# Patient Record
Sex: MALE | Race: WHITE | Employment: FULL TIME | URBAN - METROPOLITAN AREA
[De-identification: names, ages, dates, MRNs, and addresses within clinical notes are randomized per-mention and may not be internally consistent; named-entity substitution may affect disease eponyms.]

---

## 2018-03-09 ENCOUNTER — OFFICE VISIT (OUTPATIENT)
Dept: INTERNAL MEDICINE CLINIC | Facility: CLINIC | Age: 43
End: 2018-03-09

## 2018-03-09 VITALS
DIASTOLIC BLOOD PRESSURE: 90 MMHG | SYSTOLIC BLOOD PRESSURE: 150 MMHG | BODY MASS INDEX: 36.45 KG/M2 | WEIGHT: 275 LBS | HEIGHT: 73 IN | TEMPERATURE: 98 F | HEART RATE: 88 BPM | RESPIRATION RATE: 18 BRPM

## 2018-03-09 DIAGNOSIS — Z13.21 SCREENING FOR ENDOCRINE, NUTRITIONAL, METABOLIC AND IMMUNITY DISORDER: ICD-10-CM

## 2018-03-09 DIAGNOSIS — Z13.29 SCREENING FOR THYROID DISORDER: ICD-10-CM

## 2018-03-09 DIAGNOSIS — Z13.220 SCREENING FOR LIPID DISORDERS: ICD-10-CM

## 2018-03-09 DIAGNOSIS — Z30.09 VASECTOMY EVALUATION: ICD-10-CM

## 2018-03-09 DIAGNOSIS — Z13.0 SCREENING FOR ENDOCRINE, NUTRITIONAL, METABOLIC AND IMMUNITY DISORDER: ICD-10-CM

## 2018-03-09 DIAGNOSIS — Z13.0 ENCOUNTER FOR SCREENING FOR DISEASES OF THE BLOOD AND BLOOD-FORMING ORGANS AND CERTAIN DISORDERS INVOLVING THE IMMUNE MECHANISM: ICD-10-CM

## 2018-03-09 DIAGNOSIS — I10 ESSENTIAL HYPERTENSION: Primary | ICD-10-CM

## 2018-03-09 DIAGNOSIS — Z13.29 SCREENING FOR ENDOCRINE, NUTRITIONAL, METABOLIC AND IMMUNITY DISORDER: ICD-10-CM

## 2018-03-09 DIAGNOSIS — Z13.228 SCREENING FOR ENDOCRINE, NUTRITIONAL, METABOLIC AND IMMUNITY DISORDER: ICD-10-CM

## 2018-03-09 PROCEDURE — 99203 OFFICE O/P NEW LOW 30 MIN: CPT | Performed by: INTERNAL MEDICINE

## 2018-03-09 RX ORDER — METOPROLOL SUCCINATE 50 MG/1
50 TABLET, EXTENDED RELEASE ORAL DAILY
Qty: 30 TABLET | Refills: 2 | Status: SHIPPED | OUTPATIENT
Start: 2018-03-09 | End: 2018-03-22 | Stop reason: DRUGHIGH

## 2018-03-09 NOTE — PROGRESS NOTES
Rachel Blue is a 40year old male. Patient presents with:  Referral: AB RM 3  Stress: pt c/o being under stress family issues and work BP has been high 200/102      HPI:     Patient here to establish care-   with 6 kids.  They do not want any respiratory distress, lungs clear to auscultation  CARDIO: RRR nl S1 S2  GI: normal bowel sounds, no masses, HSM or tenderness  EXTREMITIES: no cyanosis, clubbing or edema, normal strength and tone  NEURO: Alert and oriented    ASSESSMENT AND PLAN:   Dilcia

## 2018-03-21 ENCOUNTER — TELEPHONE (OUTPATIENT)
Dept: INTERNAL MEDICINE CLINIC | Facility: CLINIC | Age: 43
End: 2018-03-21

## 2018-03-21 NOTE — TELEPHONE ENCOUNTER
Pt was seen a couple weeks ago his blood pressure is still pretty high. It ranges between 150-160/100.  He is scheduled for a month follow up on Future Appointments  Date Time Provider Guille Hughes   4/10/2018 9:00 AM Mellisa Tamayo MD [facility]

## 2018-03-21 NOTE — TELEPHONE ENCOUNTER
Pt stating BP consistent 150-160's/100. No sx at this time. No dizziness. No chest pain. No SOB. No HA. No palpations. Takes Metoprolol Succinate ER 50 MG daily. Increase dose? Sooner OV then 4/10? Please advise. Thank you.

## 2018-03-21 NOTE — TELEPHONE ENCOUNTER
Called pt to inform, per TB, sooner OV needed for BP- scheduled appt on 3/22 as requested. Pt verbalized understanding and agreed with POC.

## 2018-03-22 ENCOUNTER — OFFICE VISIT (OUTPATIENT)
Dept: INTERNAL MEDICINE CLINIC | Facility: CLINIC | Age: 43
End: 2018-03-22

## 2018-03-22 VITALS
TEMPERATURE: 98 F | HEIGHT: 73 IN | BODY MASS INDEX: 36.71 KG/M2 | HEART RATE: 88 BPM | SYSTOLIC BLOOD PRESSURE: 140 MMHG | DIASTOLIC BLOOD PRESSURE: 90 MMHG | WEIGHT: 277 LBS | RESPIRATION RATE: 16 BRPM

## 2018-03-22 DIAGNOSIS — I10 ESSENTIAL HYPERTENSION: Primary | ICD-10-CM

## 2018-03-22 DIAGNOSIS — E66.9 OBESITY (BMI 30-39.9): ICD-10-CM

## 2018-03-22 PROCEDURE — 99213 OFFICE O/P EST LOW 20 MIN: CPT | Performed by: INTERNAL MEDICINE

## 2018-03-22 RX ORDER — METOPROLOL SUCCINATE 100 MG/1
100 TABLET, EXTENDED RELEASE ORAL DAILY
Qty: 30 TABLET | Refills: 3 | Status: SHIPPED | OUTPATIENT
Start: 2018-03-22 | End: 2018-07-16

## 2018-03-22 NOTE — PROGRESS NOTES
Atul Jj is a 40year old male. Patient presents with:   Follow - Up: AB  RM 4, patient been taking bp at home 150/100, 160/102      HPI:     Patient here for f/u -  htn- BP still running high, 150-160/100s, no symptoms of CP/HA/SOB, tolerating me exercise to lose weight    No orders of the defined types were placed in this encounter.       Meds & Refills for this Visit:  Signed Prescriptions Disp Refills    Metoprolol Succinate  MG Oral Tablet 24 Hr 30 tablet 3      Sig: Take 1 tablet (100 mg

## 2018-04-04 ENCOUNTER — PATIENT MESSAGE (OUTPATIENT)
Dept: INTERNAL MEDICINE CLINIC | Facility: CLINIC | Age: 43
End: 2018-04-04

## 2018-04-04 RX ORDER — LISINOPRIL 10 MG/1
10 TABLET ORAL DAILY
Qty: 30 TABLET | Refills: 3 | Status: SHIPPED | OUTPATIENT
Start: 2018-04-04 | End: 2018-07-16

## 2018-04-04 NOTE — TELEPHONE ENCOUNTER
From: Cy Finnegan III  To: Meryle Barrs, MD  Sent: 4/4/2018 11:03 AM CDT  Subject: Karine Ho - BP Readings    3/29/18 - 146/96  3/30/18 - 148/98  3/31/18 - 140/90  4/01/18 - 148/99  4/02/18 - 150/100  4/03/18 - 142/90  4/04/18 - 148/98

## 2018-04-04 NOTE — TELEPHONE ENCOUNTER
ASSESSMENT AND PLAN:   Essential hypertension  -BP improved but not at goal. Will increase metoprolol succ to 100mg daily. Parameters reviewed. Can mychart me home BPs.

## 2018-05-22 RX ORDER — METOPROLOL SUCCINATE 50 MG/1
TABLET, EXTENDED RELEASE ORAL
Qty: 30 TABLET | Refills: 0 | OUTPATIENT
Start: 2018-05-22

## 2018-07-03 ENCOUNTER — LAB ENCOUNTER (OUTPATIENT)
Dept: LAB | Age: 43
End: 2018-07-03
Attending: INTERNAL MEDICINE
Payer: MEDICAID

## 2018-07-03 DIAGNOSIS — Z13.220 SCREENING FOR LIPID DISORDERS: ICD-10-CM

## 2018-07-03 DIAGNOSIS — Z13.29 SCREENING FOR THYROID DISORDER: ICD-10-CM

## 2018-07-03 DIAGNOSIS — Z13.0 SCREENING FOR ENDOCRINE, NUTRITIONAL, METABOLIC AND IMMUNITY DISORDER: ICD-10-CM

## 2018-07-03 DIAGNOSIS — Z13.29 SCREENING FOR ENDOCRINE, NUTRITIONAL, METABOLIC AND IMMUNITY DISORDER: ICD-10-CM

## 2018-07-03 DIAGNOSIS — Z13.21 SCREENING FOR ENDOCRINE, NUTRITIONAL, METABOLIC AND IMMUNITY DISORDER: ICD-10-CM

## 2018-07-03 DIAGNOSIS — Z13.228 SCREENING FOR ENDOCRINE, NUTRITIONAL, METABOLIC AND IMMUNITY DISORDER: ICD-10-CM

## 2018-07-03 DIAGNOSIS — R73.01 IFG (IMPAIRED FASTING GLUCOSE): ICD-10-CM

## 2018-07-03 DIAGNOSIS — Z13.0 ENCOUNTER FOR SCREENING FOR DISEASES OF THE BLOOD AND BLOOD-FORMING ORGANS AND CERTAIN DISORDERS INVOLVING THE IMMUNE MECHANISM: ICD-10-CM

## 2018-07-03 LAB
ALBUMIN SERPL-MCNC: 3.4 G/DL (ref 3.5–4.8)
ALP LIVER SERPL-CCNC: 78 U/L (ref 45–117)
ALT SERPL-CCNC: 22 U/L (ref 17–63)
AST SERPL-CCNC: 18 U/L (ref 15–41)
BASOPHILS # BLD AUTO: 0.03 X10(3) UL (ref 0–0.1)
BASOPHILS NFR BLD AUTO: 0.7 %
BILIRUB SERPL-MCNC: 0.9 MG/DL (ref 0.1–2)
BUN BLD-MCNC: 11 MG/DL (ref 8–20)
CALCIUM BLD-MCNC: 8.9 MG/DL (ref 8.3–10.3)
CHLORIDE: 100 MMOL/L (ref 101–111)
CHOLEST SMN-MCNC: 179 MG/DL (ref ?–200)
CO2: 26 MMOL/L (ref 22–32)
CREAT BLD-MCNC: 0.8 MG/DL (ref 0.7–1.3)
EOSINOPHIL # BLD AUTO: 0.12 X10(3) UL (ref 0–0.3)
EOSINOPHIL NFR BLD AUTO: 2.7 %
ERYTHROCYTE [DISTWIDTH] IN BLOOD BY AUTOMATED COUNT: 12.3 % (ref 11.5–16)
GLUCOSE BLD-MCNC: 247 MG/DL (ref 70–99)
HCT VFR BLD AUTO: 46.3 % (ref 37–53)
HDLC SERPL-MCNC: 33 MG/DL (ref 45–?)
HDLC SERPL: 5.42 {RATIO} (ref ?–4.97)
HGB BLD-MCNC: 15.7 G/DL (ref 13–17)
IMMATURE GRANULOCYTE COUNT: 0.01 X10(3) UL (ref 0–1)
IMMATURE GRANULOCYTE RATIO %: 0.2 %
LDLC SERPL CALC-MCNC: 110 MG/DL (ref ?–130)
LYMPHOCYTES # BLD AUTO: 1.61 X10(3) UL (ref 0.9–4)
LYMPHOCYTES NFR BLD AUTO: 35.9 %
M PROTEIN MFR SERPL ELPH: 8 G/DL (ref 6.1–8.3)
MCH RBC QN AUTO: 28.4 PG (ref 27–33.2)
MCHC RBC AUTO-ENTMCNC: 33.9 G/DL (ref 31–37)
MCV RBC AUTO: 83.9 FL (ref 80–99)
MONOCYTES # BLD AUTO: 0.39 X10(3) UL (ref 0.1–1)
MONOCYTES NFR BLD AUTO: 8.7 %
NEUTROPHIL ABS PRELIM: 2.33 X10 (3) UL (ref 1.3–6.7)
NEUTROPHILS # BLD AUTO: 2.33 X10(3) UL (ref 1.3–6.7)
NEUTROPHILS NFR BLD AUTO: 51.8 %
NONHDLC SERPL-MCNC: 146 MG/DL (ref ?–130)
PLATELET # BLD AUTO: 215 10(3)UL (ref 150–450)
POTASSIUM SERPL-SCNC: 4.8 MMOL/L (ref 3.6–5.1)
RBC # BLD AUTO: 5.52 X10(6)UL (ref 4.3–5.7)
RED CELL DISTRIBUTION WIDTH-SD: 37.4 FL (ref 35.1–46.3)
SODIUM SERPL-SCNC: 134 MMOL/L (ref 136–144)
TRIGL SERPL-MCNC: 182 MG/DL (ref ?–150)
TSI SER-ACNC: 1.59 MIU/ML (ref 0.35–5.5)
VLDLC SERPL CALC-MCNC: 36 MG/DL (ref 5–40)
WBC # BLD AUTO: 4.5 X10(3) UL (ref 4–13)

## 2018-07-03 PROCEDURE — 80061 LIPID PANEL: CPT

## 2018-07-03 PROCEDURE — 84443 ASSAY THYROID STIM HORMONE: CPT

## 2018-07-03 PROCEDURE — 85025 COMPLETE CBC W/AUTO DIFF WBC: CPT

## 2018-07-03 PROCEDURE — 80053 COMPREHEN METABOLIC PANEL: CPT

## 2018-07-03 PROCEDURE — 83036 HEMOGLOBIN GLYCOSYLATED A1C: CPT

## 2018-07-05 LAB
EST. AVERAGE GLUCOSE BLD GHB EST-MCNC: 280 MG/DL (ref 68–126)
HBA1C MFR BLD HPLC: 11.4 % (ref ?–5.7)

## 2018-07-08 ENCOUNTER — TELEPHONE (OUTPATIENT)
Dept: ENDOCRINOLOGY CLINIC | Facility: CLINIC | Age: 43
End: 2018-07-08

## 2018-07-08 DIAGNOSIS — E11.65 TYPE 2 DIABETES MELLITUS WITH HYPERGLYCEMIA, WITHOUT LONG-TERM CURRENT USE OF INSULIN (HCC): Primary | ICD-10-CM

## 2018-07-11 ENCOUNTER — TELEPHONE (OUTPATIENT)
Dept: ENDOCRINOLOGY CLINIC | Facility: CLINIC | Age: 43
End: 2018-07-11

## 2018-07-11 ENCOUNTER — DIABETIC EDUCATION (OUTPATIENT)
Dept: ENDOCRINOLOGY CLINIC | Facility: CLINIC | Age: 43
End: 2018-07-11

## 2018-07-11 VITALS — BODY MASS INDEX: 37.06 KG/M2 | HEIGHT: 73 IN | WEIGHT: 279.63 LBS

## 2018-07-11 DIAGNOSIS — E11.65 TYPE 2 DIABETES MELLITUS WITH HYPERGLYCEMIA, WITHOUT LONG-TERM CURRENT USE OF INSULIN (HCC): Primary | ICD-10-CM

## 2018-07-11 PROCEDURE — 99211 OFF/OP EST MAY X REQ PHY/QHP: CPT | Performed by: DIETITIAN, REGISTERED

## 2018-07-11 RX ORDER — LANCETS 33 GAUGE
1 EACH MISCELLANEOUS 2 TIMES DAILY
Qty: 1 BOX | Refills: 0 | Status: SHIPPED | OUTPATIENT
Start: 2018-07-11 | End: 2018-07-13 | Stop reason: ALTCHOICE

## 2018-07-11 NOTE — PROGRESS NOTES
Kallie Oconnor III  : 3/25/1975 attended Step 1 Diabetic Education:    Date: 2018   Start time: 9:15 End time: 10:15    Ht 73\"   Wt 279 lb 9.6 oz   BMI 36.89 kg/m²       HgbA1C (%)   Date Value   2018 11.4 (H)   ----------     Depression Scre at this time.   Thank you for the referral.  Amirah Bonner MS, RD, CDE, LDN

## 2018-07-13 ENCOUNTER — TELEPHONE (OUTPATIENT)
Dept: INTERNAL MEDICINE CLINIC | Facility: CLINIC | Age: 43
End: 2018-07-13

## 2018-07-13 ENCOUNTER — MED REC SCAN ONLY (OUTPATIENT)
Dept: INTERNAL MEDICINE CLINIC | Facility: CLINIC | Age: 43
End: 2018-07-13

## 2018-07-13 ENCOUNTER — TELEPHONE (OUTPATIENT)
Dept: ENDOCRINOLOGY CLINIC | Facility: CLINIC | Age: 43
End: 2018-07-13

## 2018-07-13 DIAGNOSIS — E11.65 TYPE 2 DIABETES MELLITUS WITH HYPERGLYCEMIA, WITHOUT LONG-TERM CURRENT USE OF INSULIN (HCC): ICD-10-CM

## 2018-07-13 DIAGNOSIS — E11.65 TYPE 2 DIABETES MELLITUS WITH HYPERGLYCEMIA, WITHOUT LONG-TERM CURRENT USE OF INSULIN (HCC): Primary | ICD-10-CM

## 2018-07-13 NOTE — TELEPHONE ENCOUNTER
Called Terry back and stated pt can have 32 gauge, 4 mm pens per TB. Pharmacist verbalized understanding. Called pt to inform him of approval and that I spoke to [place], he appreciated the call.

## 2018-07-13 NOTE — TELEPHONE ENCOUNTER
(Mary Doe)   Rx #: [id number]   Victoza 18MG/3ML pen-injectors   Impaired fasting glucose  Started 7/11/18    Your information has been submitted to [employer]. [employer] is reviewing the PA request and you will receive an electronic response.  You may

## 2018-07-13 NOTE — TELEPHONE ENCOUNTER
Per cover my meds, med approved. Office was faxed determination - have not received yet. Called pharmacy to inform of approval.  Pharmacist stated they have 32 gauge, 4 mm and what we sent over was 32 gauge, 5 mm.   Is it ok for pt to have 32 gauge, 4 m

## 2018-07-16 DIAGNOSIS — I10 ESSENTIAL HYPERTENSION: ICD-10-CM

## 2018-07-16 RX ORDER — METOPROLOL SUCCINATE 100 MG/1
TABLET, EXTENDED RELEASE ORAL
Qty: 30 TABLET | Refills: 0 | Status: SHIPPED | OUTPATIENT
Start: 2018-07-16 | End: 2018-08-13

## 2018-07-16 RX ORDER — LISINOPRIL 10 MG/1
TABLET ORAL
Qty: 30 TABLET | Refills: 0 | Status: SHIPPED | OUTPATIENT
Start: 2018-07-16 | End: 2018-08-13

## 2018-07-16 NOTE — TELEPHONE ENCOUNTER
LOV: 3/22/18 TB  FOV: 7/20/18  LAST RX:4/4/18 Vxjarpcrsq33 mg 1 tablet daily 30 tabs 3 refills   3/22/18 Metoprolol 100 mg 1 tab daily 30 tabs 3 refills   LAST LABS: 7/3/18 A1c,lipid,tsh,cmp,cbc  PER PROTOCOL:

## 2018-07-20 ENCOUNTER — TELEPHONE (OUTPATIENT)
Dept: ENDOCRINOLOGY CLINIC | Facility: CLINIC | Age: 43
End: 2018-07-20

## 2018-07-20 DIAGNOSIS — E11.65 TYPE 2 DIABETES MELLITUS WITH HYPERGLYCEMIA, WITHOUT LONG-TERM CURRENT USE OF INSULIN (HCC): ICD-10-CM

## 2018-07-20 NOTE — TELEPHONE ENCOUNTER
On 1.2 mg Victoza: FBS's 120's although was 140+ this morning due to cookies at HS  Post-D 110's and is eating/not avoiding carbs. He felt less nausea on 1.2 than he did on 0.6 mg Victoza. Has lost 12# but also working out a lot with much perspiration.  H

## 2018-08-13 DIAGNOSIS — I10 ESSENTIAL HYPERTENSION: ICD-10-CM

## 2018-08-13 RX ORDER — LISINOPRIL 10 MG/1
TABLET ORAL
Qty: 30 TABLET | Refills: 0 | Status: SHIPPED | OUTPATIENT
Start: 2018-08-13 | End: 2018-09-07

## 2018-08-13 RX ORDER — METOPROLOL SUCCINATE 100 MG/1
TABLET, EXTENDED RELEASE ORAL
Qty: 30 TABLET | Refills: 0 | Status: SHIPPED | OUTPATIENT
Start: 2018-08-13 | End: 2018-09-07

## 2018-08-14 ENCOUNTER — TELEPHONE (OUTPATIENT)
Dept: INTERNAL MEDICINE CLINIC | Facility: CLINIC | Age: 43
End: 2018-08-14

## 2018-08-14 NOTE — TELEPHONE ENCOUNTER
Re Hansel Bridges. [facility] Pt's dose has increased, new script has to reflect that. Basically he has one day left, did not take today. Out of needles also. Pts script for needles has been denied. Substitute? Do they need a PA.

## 2018-08-15 NOTE — TELEPHONE ENCOUNTER
Approached by  indicating pt is on day two without Victoza needing new script for dose adjustment and pen needles.     Last Script on file was sent 07/20/2018 for one month supply with one refills at a dose of 1.8mg into the skin daily which is th

## 2018-08-15 NOTE — TELEPHONE ENCOUNTER
Pt calling back, has not heard anything from our office, this will be his second day without taking his VICTOZIA. Does not have needles.

## 2018-08-30 DIAGNOSIS — E11.65 TYPE 2 DIABETES MELLITUS WITH HYPERGLYCEMIA, WITHOUT LONG-TERM CURRENT USE OF INSULIN (HCC): ICD-10-CM

## 2018-08-30 RX ORDER — LANCETS
EACH MISCELLANEOUS
Qty: 100 EACH | Refills: 0 | Status: SHIPPED | OUTPATIENT
Start: 2018-08-30 | End: 2019-03-28

## 2018-09-04 ENCOUNTER — TELEPHONE (OUTPATIENT)
Dept: INTERNAL MEDICINE CLINIC | Facility: CLINIC | Age: 43
End: 2018-09-04

## 2018-09-05 NOTE — TELEPHONE ENCOUNTER
Spoke to pt and scheduled F/U Future Appointments  Date Time Provider Guille Ellen   9/13/2018 9:00 AM Rona Connor [facility], CDE [facility]   9/15/2018 9:00 AM Magdaleno Wilde, MD [address]   9/24/2018 3:45 PM Faustino Harp Ti

## 2018-09-07 DIAGNOSIS — I10 ESSENTIAL HYPERTENSION: ICD-10-CM

## 2018-09-07 RX ORDER — LISINOPRIL 10 MG/1
TABLET ORAL
Qty: 30 TABLET | Refills: 0 | Status: SHIPPED | OUTPATIENT
Start: 2018-09-07 | End: 2018-10-03

## 2018-09-07 RX ORDER — METOPROLOL SUCCINATE 100 MG/1
TABLET, EXTENDED RELEASE ORAL
Qty: 30 TABLET | Refills: 0 | Status: SHIPPED | OUTPATIENT
Start: 2018-09-07 | End: 2018-10-03

## 2018-09-24 ENCOUNTER — OFFICE VISIT (OUTPATIENT)
Dept: INTERNAL MEDICINE CLINIC | Facility: CLINIC | Age: 43
End: 2018-09-24
Payer: MEDICAID

## 2018-09-24 VITALS
DIASTOLIC BLOOD PRESSURE: 88 MMHG | BODY MASS INDEX: 36.31 KG/M2 | RESPIRATION RATE: 16 BRPM | SYSTOLIC BLOOD PRESSURE: 136 MMHG | HEART RATE: 74 BPM | WEIGHT: 274 LBS | HEIGHT: 73 IN | TEMPERATURE: 98 F

## 2018-09-24 DIAGNOSIS — Z23 NEED FOR INFLUENZA VACCINATION: ICD-10-CM

## 2018-09-24 DIAGNOSIS — I10 ESSENTIAL HYPERTENSION: ICD-10-CM

## 2018-09-24 DIAGNOSIS — N52.9 ERECTILE DYSFUNCTION, UNSPECIFIED ERECTILE DYSFUNCTION TYPE: ICD-10-CM

## 2018-09-24 DIAGNOSIS — R94.31 ABNORMAL EKG: ICD-10-CM

## 2018-09-24 DIAGNOSIS — E11.9 NEW ONSET TYPE 2 DIABETES MELLITUS (HCC): Primary | ICD-10-CM

## 2018-09-24 PROCEDURE — 90686 IIV4 VACC NO PRSV 0.5 ML IM: CPT | Performed by: INTERNAL MEDICINE

## 2018-09-24 PROCEDURE — 90471 IMMUNIZATION ADMIN: CPT | Performed by: INTERNAL MEDICINE

## 2018-09-24 PROCEDURE — 93000 ELECTROCARDIOGRAM COMPLETE: CPT | Performed by: INTERNAL MEDICINE

## 2018-09-24 PROCEDURE — 99214 OFFICE O/P EST MOD 30 MIN: CPT | Performed by: INTERNAL MEDICINE

## 2018-09-24 NOTE — PROGRESS NOTES
Bolivar Fabian is a 44year old male. Patient presents with:  Diabetic Flu  Erectile Dysfuntion  HTN  Cholesterol      HPI:     Patient here for f/u-  Newly diagnosed DM2 - -160, post prandial in good range.  Tolerating victoza, lost 10+ pounds o Tobacco Use      Smoking status: Never Smoker      Smokeless tobacco: Never Used    Alcohol use:  Yes      Alcohol/week: 0.6 oz      Types: 1 Cans of beer per week      Comment: Cage done 09/24/2018, rare     Drug use: No    Family History   Problem Relatio leads, abnormal EKG    ASSESSMENT AND PLAN:   New onset type 2 diabetes mellitus (hcc) - BG almost to goal, will add metformin in the morning to Victoza.  Foot exam done today, referred to optho  Essential hypertension- controlled, cpm  Erectile dysfunction

## 2018-09-25 ENCOUNTER — TELEPHONE (OUTPATIENT)
Dept: INTERNAL MEDICINE CLINIC | Facility: CLINIC | Age: 43
End: 2018-09-25

## 2018-09-25 DIAGNOSIS — E11.9 NEW ONSET TYPE 2 DIABETES MELLITUS (HCC): Primary | ICD-10-CM

## 2018-09-25 DIAGNOSIS — E11.65 TYPE 2 DIABETES MELLITUS WITH HYPERGLYCEMIA, WITHOUT LONG-TERM CURRENT USE OF INSULIN (HCC): ICD-10-CM

## 2018-09-25 RX ORDER — BLOOD SUGAR DIAGNOSTIC
STRIP MISCELLANEOUS
Qty: 100 STRIP | Refills: 0 | Status: SHIPPED | OUTPATIENT
Start: 2018-09-25 | End: 2019-03-28

## 2018-09-25 NOTE — TELEPHONE ENCOUNTER
Please inform patient he will need to call his insurance to find out who is in network and call us back with doctor's name, phone number and fax number, this information is needed for the referral. Thank you.

## 2018-09-25 NOTE — TELEPHONE ENCOUNTER
Pt called his insurance and was given 2 different doctors. ... Dr Buster Dinero is in his network but 1st available for new pt's is not until November.      Also Dr Bhavana Solano but he is with [facility] and pt was told they are not able to take him because he is not being re

## 2018-09-25 NOTE — TELEPHONE ENCOUNTER
Spoke with pt informed him info listed below. He will call the insurance and will given us a call back with all the info needed.

## 2018-09-26 NOTE — TELEPHONE ENCOUNTER
Hard to specialists in network with his insurance.  I think he should wait for Dr. Rosana Packer, Nov appt is ok  He should call his insurance and find out which specialist are in his network (for derm), I can place referral once he gives us the name

## 2018-09-26 NOTE — TELEPHONE ENCOUNTER
Called pt to inform, per TB, ok to see Karen in November. Referral to  [facility] [address] generated. Pt verbalized understanding and agreed with POC.

## 2018-10-03 DIAGNOSIS — E11.65 TYPE 2 DIABETES MELLITUS WITH HYPERGLYCEMIA, WITHOUT LONG-TERM CURRENT USE OF INSULIN (HCC): ICD-10-CM

## 2018-10-03 DIAGNOSIS — I10 ESSENTIAL HYPERTENSION: ICD-10-CM

## 2018-10-03 RX ORDER — LISINOPRIL 10 MG/1
TABLET ORAL
Qty: 30 TABLET | Refills: 3 | Status: SHIPPED | OUTPATIENT
Start: 2018-10-03 | End: 2019-03-24

## 2018-10-03 RX ORDER — METOPROLOL SUCCINATE 100 MG/1
TABLET, EXTENDED RELEASE ORAL
Qty: 30 TABLET | Refills: 3 | Status: SHIPPED | OUTPATIENT
Start: 2018-10-03 | End: 2019-03-24

## 2018-10-04 NOTE — TELEPHONE ENCOUNTER
Last Office Visit: 9-24-18 with TB for diabetic follow up  Last Rx Filled: 7-20-18 9ml with 1 refill   Last Labs: 7-3-18 hga1c/lipid/tsh/cmp/cbc  Future Appointment: none    Per protocol to provider

## 2018-10-05 RX ORDER — LIRAGLUTIDE 6 MG/ML
INJECTION SUBCUTANEOUS
Qty: 27 ML | Refills: 0 | Status: SHIPPED | OUTPATIENT
Start: 2018-10-05 | End: 2019-01-05

## 2018-11-18 DIAGNOSIS — E11.9 NEW ONSET TYPE 2 DIABETES MELLITUS (HCC): ICD-10-CM

## 2018-11-19 NOTE — TELEPHONE ENCOUNTER
Pt was due back in October   No apt on file, ,uncontrolled  Please call pt to schedule  Protocol to provider

## 2019-01-05 DIAGNOSIS — E11.65 TYPE 2 DIABETES MELLITUS WITH HYPERGLYCEMIA, WITHOUT LONG-TERM CURRENT USE OF INSULIN (HCC): ICD-10-CM

## 2019-01-07 RX ORDER — LIRAGLUTIDE 6 MG/ML
INJECTION SUBCUTANEOUS
Qty: 27 ML | Refills: 0 | Status: SHIPPED | OUTPATIENT
Start: 2019-01-07 | End: 2019-02-01

## 2019-01-07 NOTE — TELEPHONE ENCOUNTER
Last Office Visit: 9-24-18 with TB for diabetes  Last Rx Filled: 10-5-18 27ml with no refills  Last Labs: 7-3-18 hga1c/lipid/tsh/cmp/cbc  Future Appointment: none    Per protocol to provider

## 2019-01-13 DIAGNOSIS — E11.9 NEW ONSET TYPE 2 DIABETES MELLITUS (HCC): ICD-10-CM

## 2019-01-14 NOTE — TELEPHONE ENCOUNTER
Protocol failed due to Last HgBA1C < 7.5, HgBA1C procedure resulted in past 6 months    Please advise,    LOV:9/24/18  FOV:2/1/19   LAST RX:11/19/18 500 mg take 1 tab daily 30 tabs 0 refills   LAST LABS:7/3/18 a1c,lipids,tsh,cmp,cbc  PER PROTOCOL: to provi

## 2019-01-17 NOTE — TELEPHONE ENCOUNTER
Future Appointments   Date Time Provider Guille Ellen   2/1/2019  1:30 PM Kanwal Millard MD [facility]

## 2019-02-01 ENCOUNTER — OFFICE VISIT (OUTPATIENT)
Dept: INTERNAL MEDICINE CLINIC | Facility: CLINIC | Age: 44
End: 2019-02-01
Payer: MEDICAID

## 2019-02-01 VITALS
HEIGHT: 73 IN | WEIGHT: 259 LBS | DIASTOLIC BLOOD PRESSURE: 80 MMHG | TEMPERATURE: 99 F | BODY MASS INDEX: 34.33 KG/M2 | RESPIRATION RATE: 16 BRPM | SYSTOLIC BLOOD PRESSURE: 128 MMHG | HEART RATE: 84 BPM

## 2019-02-01 DIAGNOSIS — E78.5 HYPERLIPIDEMIA LDL GOAL <100: ICD-10-CM

## 2019-02-01 DIAGNOSIS — I10 ESSENTIAL HYPERTENSION: ICD-10-CM

## 2019-02-01 DIAGNOSIS — E11.42 DIABETIC POLYNEUROPATHY ASSOCIATED WITH TYPE 2 DIABETES MELLITUS (HCC): Primary | ICD-10-CM

## 2019-02-01 DIAGNOSIS — M79.671 PAIN IN BOTH FEET: ICD-10-CM

## 2019-02-01 DIAGNOSIS — M79.672 PAIN IN BOTH FEET: ICD-10-CM

## 2019-02-01 PROCEDURE — 99214 OFFICE O/P EST MOD 30 MIN: CPT | Performed by: INTERNAL MEDICINE

## 2019-02-01 NOTE — PROGRESS NOTES
Jen Rogelio is a 44year old male.   Patient presents with:  Diabetes: cn room 10: patient is here for dm follow up       HPI:     Patient with DM2, HTN, obesity, HL here for f/u  DM2 - FBG , PPBG 130-140, eating healthy, lost over 20 pounds sin 1 kit Rfl: 0   Liraglutide (VICTOZA) 18 MG/3ML Subcutaneous Solution Pen-injector Inject 0.6 mg into the skin daily.  Disp: 3 mL Rfl: 0      Past Medical History:   Diagnosis Date   • Diabetes ([facility]       Social History:  Social History    Tobacco Use normal  NEURO: Alert and oriented, no focal deficits    ASSESSMENT AND PLAN:      Diabetic polyneuropathy associated with type 2 diabetes mellitus (hcc)  - reports BG well controlled.  Check all dm labs now (in system already), reminded to have eye exam don

## 2019-02-07 DIAGNOSIS — E11.9 NEW ONSET TYPE 2 DIABETES MELLITUS (HCC): ICD-10-CM

## 2019-02-27 DIAGNOSIS — E11.65 TYPE 2 DIABETES MELLITUS WITH HYPERGLYCEMIA, WITHOUT LONG-TERM CURRENT USE OF INSULIN (HCC): ICD-10-CM

## 2019-03-24 DIAGNOSIS — I10 ESSENTIAL HYPERTENSION: ICD-10-CM

## 2019-03-25 RX ORDER — METOPROLOL SUCCINATE 100 MG/1
TABLET, EXTENDED RELEASE ORAL
Qty: 90 TABLET | Refills: 0 | Status: SHIPPED | OUTPATIENT
Start: 2019-03-25 | End: 2019-06-25

## 2019-03-25 RX ORDER — LISINOPRIL 10 MG/1
TABLET ORAL
Qty: 90 TABLET | Refills: 0 | Status: SHIPPED | OUTPATIENT
Start: 2019-03-25 | End: 2019-06-25

## 2019-03-25 NOTE — TELEPHONE ENCOUNTER
Last Office Visit: 2-1-19 with TB for diabetes  Last Rx Filled: 10-3-18 30 tabs with 3 refills  Last Labs: 7-3-18 hga1c/lipid/tsh/cbc/cmp  Future Appointment: none    Per protocol to provider

## 2019-03-28 ENCOUNTER — TELEPHONE (OUTPATIENT)
Dept: INTERNAL MEDICINE CLINIC | Facility: CLINIC | Age: 44
End: 2019-03-28

## 2019-03-28 DIAGNOSIS — E11.65 TYPE 2 DIABETES MELLITUS WITH HYPERGLYCEMIA, WITHOUT LONG-TERM CURRENT USE OF INSULIN (HCC): ICD-10-CM

## 2019-03-28 RX ORDER — BLOOD SUGAR DIAGNOSTIC
STRIP MISCELLANEOUS
Qty: 100 STRIP | Refills: 0 | Status: SHIPPED | OUTPATIENT
Start: 2019-03-28 | End: 2019-03-28

## 2019-03-28 RX ORDER — BLOOD-GLUCOSE METER
1 KIT MISCELLANEOUS ONCE
Qty: 1 KIT | Refills: 0 | Status: SHIPPED | OUTPATIENT
Start: 2019-03-28 | End: 2019-03-28

## 2019-03-28 NOTE — TELEPHONE ENCOUNTER
[facility] called stating that the Contour test strips are not covered by [employer]. They will need a new kit sent over for One Touch Ultra; Machine,Lancets and test strips.

## 2019-03-28 NOTE — TELEPHONE ENCOUNTER
LOV-2/1/19 TB  FOV-none  LAST RX-7/11/18 3ml 0 refill  LAST LABS-7/3/18 a1c- 11.4  PER PROTOCOL-to provider

## 2019-03-29 RX ORDER — LIRAGLUTIDE 6 MG/ML
INJECTION SUBCUTANEOUS
Qty: 6 ML | Refills: 0 | Status: SHIPPED | OUTPATIENT
Start: 2019-03-29 | End: 2019-04-23

## 2019-04-13 DIAGNOSIS — E11.9 NEW ONSET TYPE 2 DIABETES MELLITUS (HCC): ICD-10-CM

## 2019-04-15 NOTE — TELEPHONE ENCOUNTER
Protocol failed due to Last HgBA1C < 7.5,HgBA1C procedure resulted in past 6 months    Please advise,    LOV:2/1/19 TB  FOV:none on file   LAST RX: 2/7/19 500 mg take 1 tab daily 90 tabs 0 refills   LAST LABS:2/25/19 dmg lab post vas semen  PER PROTOCOL: t

## 2019-04-23 DIAGNOSIS — E11.65 TYPE 2 DIABETES MELLITUS WITH HYPERGLYCEMIA, WITHOUT LONG-TERM CURRENT USE OF INSULIN (HCC): ICD-10-CM

## 2019-04-25 RX ORDER — LIRAGLUTIDE 6 MG/ML
INJECTION SUBCUTANEOUS
Qty: 6 ML | Refills: 0 | Status: SHIPPED | OUTPATIENT
Start: 2019-04-25 | End: 2019-06-29

## 2019-04-25 NOTE — TELEPHONE ENCOUNTER
LOV-2/1/19 TB  FOV-none  LAST RX-3/29/19 6 ml 0 refill  LAST LABS-7/3/18 a1c-11.4  PER PROTOCOL-to provider

## 2019-06-16 DIAGNOSIS — E11.65 TYPE 2 DIABETES MELLITUS WITH HYPERGLYCEMIA, WITHOUT LONG-TERM CURRENT USE OF INSULIN (HCC): ICD-10-CM

## 2019-06-17 RX ORDER — LIRAGLUTIDE 6 MG/ML
INJECTION SUBCUTANEOUS
Qty: 9 ML | Refills: 0 | OUTPATIENT
Start: 2019-06-17

## 2019-06-17 NOTE — TELEPHONE ENCOUNTER
Did not pass protocol- pt due for A1C  PSR please notify pt he is overdue for fasting diabetic labs previous labs done on 7/3/18.     Per protocol routed to provider

## 2019-06-24 NOTE — TELEPHONE ENCOUNTER
Future Appointments   Date Time Provider Guille Ellen   6/27/2019  9:30 AM Holli Burkett MD [facility] [facility]

## 2019-06-25 DIAGNOSIS — I10 ESSENTIAL HYPERTENSION: ICD-10-CM

## 2019-06-25 RX ORDER — METOPROLOL SUCCINATE 100 MG/1
TABLET, EXTENDED RELEASE ORAL
Qty: 90 TABLET | Refills: 0 | Status: SHIPPED | OUTPATIENT
Start: 2019-06-25 | End: 2019-10-30

## 2019-06-25 RX ORDER — LISINOPRIL 10 MG/1
TABLET ORAL
Qty: 90 TABLET | Refills: 0 | Status: SHIPPED | OUTPATIENT
Start: 2019-06-25 | End: 2019-10-30

## 2019-06-27 ENCOUNTER — TELEPHONE (OUTPATIENT)
Dept: INTERNAL MEDICINE CLINIC | Facility: CLINIC | Age: 44
End: 2019-06-27

## 2019-06-27 NOTE — TELEPHONE ENCOUNTER
Pt was NO SHOW for 06/27/19 appt. Unable to LM as voicemail was full.  Sent letter Via [phone]

## 2019-06-29 DIAGNOSIS — E11.65 TYPE 2 DIABETES MELLITUS WITH HYPERGLYCEMIA, WITHOUT LONG-TERM CURRENT USE OF INSULIN (HCC): ICD-10-CM

## 2019-06-29 RX ORDER — LIRAGLUTIDE 6 MG/ML
INJECTION SUBCUTANEOUS
Qty: 3 ML | Refills: 0 | Status: SHIPPED | OUTPATIENT
Start: 2019-06-29 | End: 2019-10-30

## 2019-06-29 NOTE — TELEPHONE ENCOUNTER
Did not pass protocol- pt due for all DM labs previous A1C was 11.4 on 7/3/18  Per protocol routed to provider on call

## 2019-06-29 NOTE — TELEPHONE ENCOUNTER
Overdue for all diabetic labs and follow up  Last a1c >11 in July 2018   No show for last ov.   Needs ov with TB  1 pen sent to pharmacy

## 2019-07-09 NOTE — TELEPHONE ENCOUNTER
Future Appointments   Date Time Provider Guille Ellen   7/24/2019 11:00 AM Holli Burkett MD [facility] [address] [facility]

## 2019-07-23 ENCOUNTER — TELEPHONE (OUTPATIENT)
Dept: ENDOCRINOLOGY CLINIC | Facility: CLINIC | Age: 44
End: 2019-07-23

## 2019-07-23 DIAGNOSIS — E11.65 TYPE 2 DIABETES MELLITUS WITH HYPERGLYCEMIA, WITHOUT LONG-TERM CURRENT USE OF INSULIN (HCC): ICD-10-CM

## 2019-07-23 RX ORDER — LIRAGLUTIDE 6 MG/ML
INJECTION SUBCUTANEOUS
Qty: 27 ML | Refills: 0 | OUTPATIENT
Start: 2019-07-23

## 2019-07-23 NOTE — TELEPHONE ENCOUNTER
PT called and stated that this medication was originally sent over on 06/29/19 and it stated that he is to administer 0.6mg under skin daily. Pt states that he is supposed to take 1.8mg under skin daily. As of now he has been with out the medication for 2 days.      Please clarify and send new RX

## 2019-07-24 NOTE — TELEPHONE ENCOUNTER
Spoke to pt and he is aware that labs need to be completed before we can refill this medication.      Pt verbalized understanding and will get them done ASAP

## 2019-08-24 DIAGNOSIS — E11.9 NEW ONSET TYPE 2 DIABETES MELLITUS (HCC): ICD-10-CM

## 2019-08-24 NOTE — TELEPHONE ENCOUNTER
Last Ov: 2/1/19, TB, DM F/U  Upcoming appt: no upcoming appt   Last labs: CMP, TSH w Ref T4, Lipid, A1 7/3/18  Last Rx: metformin 500mg, #90, 0R 4/15/19    Per Protocol, failed. Pt due for A1c. Rx pending.

## 2019-08-27 NOTE — TELEPHONE ENCOUNTER
Spoke with patient and he is aware that he needs to get labs done and a f/u visit with Dr. Gomez Speaker. Patient is on the road traveling all the time and will have to wait till his schedule frees up so he can schedule.

## 2019-09-19 RX ORDER — LANCETS 33 GAUGE
EACH MISCELLANEOUS
Qty: 100 EACH | Refills: 1 | Status: SHIPPED | OUTPATIENT
Start: 2019-09-19 | End: 2019-10-30

## 2019-09-19 NOTE — TELEPHONE ENCOUNTER
Last Ov: 2/1/19, TB, DM f/u  Upcoming appt: no upcoming appt  Last labs: CBC, CMP, TSH w Ref T4, Lipid, A1c 7/3/18  Last Rx: OneTouch strip, #100 strip, 1R 3/28/19    Per Protocol, passed. Refill sent.

## 2019-09-23 DIAGNOSIS — I10 ESSENTIAL HYPERTENSION: ICD-10-CM

## 2019-09-23 DIAGNOSIS — E11.9 NEW ONSET TYPE 2 DIABETES MELLITUS (HCC): Primary | ICD-10-CM

## 2019-09-23 NOTE — TELEPHONE ENCOUNTER
Protocols failed due to CMP or BMP in past 12 months    Please advise,    LOV:2/1/19 TB  FOV:none on file   LAST RX:  Lisinopril:6/25/19 10 mg take 1 tab daily 90 tabs 0 refills     Metoprolol: 6/25/19 100 mg take 1 tab daily 90 tabs 0 refills     LAST LAB

## 2019-09-24 RX ORDER — METOPROLOL SUCCINATE 100 MG/1
TABLET, EXTENDED RELEASE ORAL
Qty: 90 TABLET | Refills: 0 | OUTPATIENT
Start: 2019-09-24

## 2019-09-24 RX ORDER — LISINOPRIL 10 MG/1
TABLET ORAL
Qty: 90 TABLET | Refills: 0 | OUTPATIENT
Start: 2019-09-24

## 2019-09-24 NOTE — TELEPHONE ENCOUNTER
PSR: please call pt to schedule CPE and advise pt to complete fasting labs and urine test prior to appt. Thank you. Reordered CPE labs as previous orders were .

## 2019-10-19 NOTE — TELEPHONE ENCOUNTER
Per Protocol, passed. Refill sent. FWD to [facility], please assist pt in scheduling physical, pt due.

## 2019-10-29 ENCOUNTER — APPOINTMENT (OUTPATIENT)
Dept: LAB | Age: 44
End: 2019-10-29
Attending: INTERNAL MEDICINE
Payer: MEDICAID

## 2019-10-29 DIAGNOSIS — E11.9 NEW ONSET TYPE 2 DIABETES MELLITUS (HCC): ICD-10-CM

## 2019-10-29 DIAGNOSIS — I10 ESSENTIAL HYPERTENSION: ICD-10-CM

## 2019-10-29 PROCEDURE — 83036 HEMOGLOBIN GLYCOSYLATED A1C: CPT

## 2019-10-29 PROCEDURE — 80053 COMPREHEN METABOLIC PANEL: CPT

## 2019-10-29 PROCEDURE — 82570 ASSAY OF URINE CREATININE: CPT

## 2019-10-29 PROCEDURE — 82043 UR ALBUMIN QUANTITATIVE: CPT

## 2019-10-29 PROCEDURE — 80061 LIPID PANEL: CPT

## 2019-10-30 ENCOUNTER — OFFICE VISIT (OUTPATIENT)
Dept: INTERNAL MEDICINE CLINIC | Facility: CLINIC | Age: 44
End: 2019-10-30
Payer: MEDICAID

## 2019-10-30 VITALS
RESPIRATION RATE: 16 BRPM | DIASTOLIC BLOOD PRESSURE: 88 MMHG | BODY MASS INDEX: 37.37 KG/M2 | TEMPERATURE: 98 F | SYSTOLIC BLOOD PRESSURE: 144 MMHG | WEIGHT: 282 LBS | HEIGHT: 73 IN | OXYGEN SATURATION: 98 % | HEART RATE: 74 BPM

## 2019-10-30 DIAGNOSIS — I10 ESSENTIAL HYPERTENSION: ICD-10-CM

## 2019-10-30 DIAGNOSIS — E11.42 DIABETIC POLYNEUROPATHY ASSOCIATED WITH TYPE 2 DIABETES MELLITUS (HCC): ICD-10-CM

## 2019-10-30 DIAGNOSIS — E78.5 HYPERLIPIDEMIA LDL GOAL <100: ICD-10-CM

## 2019-10-30 DIAGNOSIS — E11.21 TYPE 2 DIABETES MELLITUS WITH DIABETIC NEPHROPATHY, WITHOUT LONG-TERM CURRENT USE OF INSULIN (HCC): ICD-10-CM

## 2019-10-30 DIAGNOSIS — Z00.00 ANNUAL PHYSICAL EXAM: Primary | ICD-10-CM

## 2019-10-30 PROCEDURE — 99396 PREV VISIT EST AGE 40-64: CPT | Performed by: PHYSICIAN ASSISTANT

## 2019-10-30 RX ORDER — ATORVASTATIN CALCIUM 20 MG/1
20 TABLET, FILM COATED ORAL NIGHTLY
Qty: 90 TABLET | Refills: 0 | Status: SHIPPED | OUTPATIENT
Start: 2019-10-30 | End: 2020-02-03

## 2019-10-30 RX ORDER — METOPROLOL SUCCINATE 100 MG/1
TABLET, EXTENDED RELEASE ORAL
Qty: 90 TABLET | Refills: 0 | Status: SHIPPED | OUTPATIENT
Start: 2019-10-30 | End: 2020-01-21

## 2019-10-30 RX ORDER — LISINOPRIL 10 MG/1
TABLET ORAL
Qty: 90 TABLET | Refills: 0 | Status: SHIPPED | OUTPATIENT
Start: 2019-10-30 | End: 2019-12-12

## 2019-10-30 RX ORDER — LANCETS 33 GAUGE
EACH MISCELLANEOUS
Qty: 100 EACH | Refills: 1 | Status: SHIPPED | OUTPATIENT
Start: 2019-10-30 | End: 2020-03-24

## 2019-10-30 NOTE — PROGRESS NOTES
Patient presents with:  Physical: RG rm 6 Physical      HPI:  Pt presents for annual physical.  Has been off all meds for over a month due to lack of office visit. Pt travels a great deal for work and has been unable to get in for follow up visit.     DM I with type 2 diabetes mellitus ([facility]      Past Medical History:   Diagnosis Date   • Diabetes ([facility]      No past surgical history on file.   Family History   Problem Relation Age of Onset   • Diabetes Mother    • High Blood Pressure Father    • Diabetes Fath a visit.       Physical Exam  /88 (BP Location: Right arm, Patient Position: Sitting, Cuff Size: large)   Pulse 74   Temp 98.3 °F (36.8 °C) (Oral)   Resp 16   Ht 73\"   Wt 282 lb (127.9 kg)   SpO2 98%   BMI 37.21 kg/m²   Constitutional: Oriented to pe 14.3  10.0 - 20.0 Final   • Calcium, Total 10/29/2019 8.8  8.5 - 10.1 mg/dL Final   • Calculated Osmolality 10/29/2019 284  275 - 295 mOsm/kg Final   • GFR, Non- 10/29/2019 107  >=60 Final   • GFR, -American 10/29/2019 124  >=60 Sara <100 - See above, start Atorvastatin. Diabetic polyneuropathy associated with type 2 diabetes mellitus (hcc) - Discussed importance of improved DM control. Pt voiced understanding.     Orders Placed This Encounter      Lipid Panel [E]      Comp Metabolic

## 2019-10-31 ENCOUNTER — TELEPHONE (OUTPATIENT)
Dept: INTERNAL MEDICINE CLINIC | Facility: CLINIC | Age: 44
End: 2019-10-31

## 2019-10-31 NOTE — TELEPHONE ENCOUNTER
Patient saw Isaura Aguila yesterday, had been off of all medications for about 2 months, CB told him to restart medications, states he took Atorvastatin and Metformin last night then took Lisinopril, Metformin, metoprolol and Victoza 1.8 this am.  Pt states about 10-

## 2019-10-31 NOTE — TELEPHONE ENCOUNTER
I would suggest we try the Victoza 0.6 mg dose for one week and then titrate up to 1.2 mg for 1 week and then titrate up to 1.8 mg. If still with nausea he should call us back.

## 2019-10-31 NOTE — TELEPHONE ENCOUNTER
Called pt to inform, per CB, suggested pt try the Victoza 0.6 mg dose for one week and then titrate up to 1.2 mg for 1 week and then titrate up to 1.8 mg. Advised if still with nausea to call us back.  Confirmed pt reviewed labs results from 10/29/19 with LAURA

## 2019-10-31 NOTE — TELEPHONE ENCOUNTER
Pt stated he was seen yesterday and he is not feeling well Since taking his diabetes meds again.  Pt is feeling nauseous Please advise

## 2019-11-24 DIAGNOSIS — E11.21 TYPE 2 DIABETES MELLITUS WITH DIABETIC NEPHROPATHY, WITHOUT LONG-TERM CURRENT USE OF INSULIN (HCC): ICD-10-CM

## 2019-11-25 NOTE — TELEPHONE ENCOUNTER
Last VISIT 10/30/19  Last REFILL 10/30/19 qty 9 mL w/0 refills   Last LABS 10/29/19 A1c was 8.2  Future Appointments   Date Time Provider Guille Hughes   12/12/2019 10:30 AM Pricila Corrales PA-C [facility]       Per PROTOCOL?   Failed

## 2019-12-11 ENCOUNTER — LAB ENCOUNTER (OUTPATIENT)
Dept: LAB | Age: 44
End: 2019-12-11
Attending: PHYSICIAN ASSISTANT
Payer: MEDICAID

## 2019-12-11 DIAGNOSIS — E78.5 HYPERLIPIDEMIA LDL GOAL <100: ICD-10-CM

## 2019-12-11 PROCEDURE — 80053 COMPREHEN METABOLIC PANEL: CPT

## 2019-12-11 PROCEDURE — 80061 LIPID PANEL: CPT

## 2019-12-12 ENCOUNTER — OFFICE VISIT (OUTPATIENT)
Dept: INTERNAL MEDICINE CLINIC | Facility: CLINIC | Age: 44
End: 2019-12-12
Payer: MEDICAID

## 2019-12-12 VITALS
DIASTOLIC BLOOD PRESSURE: 94 MMHG | SYSTOLIC BLOOD PRESSURE: 134 MMHG | TEMPERATURE: 98 F | WEIGHT: 268 LBS | BODY MASS INDEX: 35.52 KG/M2 | RESPIRATION RATE: 16 BRPM | OXYGEN SATURATION: 98 % | HEIGHT: 73 IN | HEART RATE: 72 BPM

## 2019-12-12 DIAGNOSIS — E78.5 HYPERLIPIDEMIA LDL GOAL <100: ICD-10-CM

## 2019-12-12 DIAGNOSIS — I10 ESSENTIAL HYPERTENSION: ICD-10-CM

## 2019-12-12 DIAGNOSIS — E11.21 TYPE 2 DIABETES MELLITUS WITH DIABETIC NEPHROPATHY, WITHOUT LONG-TERM CURRENT USE OF INSULIN (HCC): Primary | ICD-10-CM

## 2019-12-12 DIAGNOSIS — J01.00 ACUTE NON-RECURRENT MAXILLARY SINUSITIS: ICD-10-CM

## 2019-12-12 PROCEDURE — 99214 OFFICE O/P EST MOD 30 MIN: CPT | Performed by: PHYSICIAN ASSISTANT

## 2019-12-12 RX ORDER — AMOXICILLIN AND CLAVULANATE POTASSIUM 875; 125 MG/1; MG/1
1 TABLET, FILM COATED ORAL 2 TIMES DAILY
Qty: 20 TABLET | Refills: 0 | Status: SHIPPED | OUTPATIENT
Start: 2019-12-12 | End: 2019-12-22

## 2019-12-12 RX ORDER — CEFDINIR 300 MG/1
CAPSULE ORAL
Refills: 0 | COMMUNITY
Start: 2019-11-27 | End: 2020-02-13 | Stop reason: ALTCHOICE

## 2019-12-12 RX ORDER — LISINOPRIL 20 MG/1
TABLET ORAL
Qty: 90 TABLET | Refills: 0 | Status: SHIPPED | OUTPATIENT
Start: 2019-12-12 | End: 2020-02-13 | Stop reason: ALTCHOICE

## 2019-12-12 NOTE — PROGRESS NOTES
Patient presents with:  Lab Results: RG rm 6 f/u labs      HPI:  Pt present for follow up of DM, HTN, Chol. He is also c/o a sinus infection. DM II - Tolerating Metformin and Victoza OK. Some GI issues when he restarted but overall doing better.   PRABHA ramírez TAKE 1 TABLET(500 MG) BY MOUTH BID with food 180 tablet 0   • atorvastatin 20 MG Oral Tab Take 1 tablet (20 mg total) by mouth nightly.  90 tablet 0   • Insulin Pen Needle (TRUEPLUS PEN NEEDLES) 32G X 4 MM Does not apply Misc Inject 1 each into the skin emelia Calculated Osmolality 12/11/2019 292  275 - 295 mOsm/kg Final   • GFR, Non- 12/11/2019 106  >=60 Final   • GFR, -American 12/11/2019 122  >=60 Final   • AST 12/11/2019 26  15 - 37 U/L Final   • ALT 12/11/2019 36  16 - 61 U/L Final

## 2019-12-13 ENCOUNTER — TELEPHONE (OUTPATIENT)
Dept: INTERNAL MEDICINE CLINIC | Facility: CLINIC | Age: 44
End: 2019-12-13

## 2019-12-23 ENCOUNTER — MED REC SCAN ONLY (OUTPATIENT)
Dept: INTERNAL MEDICINE CLINIC | Facility: CLINIC | Age: 44
End: 2019-12-23

## 2019-12-26 ENCOUNTER — TELEPHONE (OUTPATIENT)
Dept: INTERNAL MEDICINE CLINIC | Facility: CLINIC | Age: 44
End: 2019-12-26

## 2019-12-26 NOTE — TELEPHONE ENCOUNTER
Results received from [address] in regards to pt's DM eye exam.  Results abstracted, provider reviewed.

## 2019-12-30 ENCOUNTER — TELEPHONE (OUTPATIENT)
Dept: INTERNAL MEDICINE CLINIC | Facility: CLINIC | Age: 44
End: 2019-12-30

## 2019-12-30 DIAGNOSIS — E11.21 TYPE 2 DIABETES MELLITUS WITH DIABETIC NEPHROPATHY, WITHOUT LONG-TERM CURRENT USE OF INSULIN (HCC): ICD-10-CM

## 2019-12-30 DIAGNOSIS — S42.409D CLOSED FRACTURE OF ELBOW WITH ROUTINE HEALING, UNSPECIFIED LATERALITY, SUBSEQUENT ENCOUNTER: Primary | ICD-10-CM

## 2019-12-30 NOTE — TELEPHONE ENCOUNTER
Last Ov: 12/12/19, CB, F/U  Upcoming appt: 2/13/20, CB  Last labs: CMP, Lipid 12/11/19  Last Rx: Victoza 18mg/3mL, 9mL, 0R 11/25/19    Per Protocol, failed. Last A1c out of range. Rx pending.

## 2019-12-30 NOTE — TELEPHONE ENCOUNTER
Will refer to Dr Cherelle Marroquin for initial evaluation due to insurance issues. He may want to look into ortho hand specialist in his network incase Dr Liana Armas is not able to care for his injury. There are no images available in care everywhere.   Ninoska

## 2019-12-30 NOTE — TELEPHONE ENCOUNTER
Pt fell on Saturday and went to [facility] and he has fx in joint of L elbow and needs to see ortho-has [facility] and needs a referral to see someone-call to advise

## 2019-12-31 NOTE — TELEPHONE ENCOUNTER
Spoke with patient notified SD Is referring him to ortho Dr. Yani Lundberg for initial evaluation due to insurance issues, also advised patient may want to look into ortho hand specialist in his network incase Dr. Yani Lundberg is not able to care for his injury.  Pa

## 2020-01-02 ENCOUNTER — TELEPHONE (OUTPATIENT)
Dept: ORTHOPEDICS CLINIC | Facility: CLINIC | Age: 45
End: 2020-01-02

## 2020-01-02 DIAGNOSIS — M25.522 LEFT ELBOW PAIN: Primary | ICD-10-CM

## 2020-01-02 NOTE — TELEPHONE ENCOUNTER
Patient notified that x-rays were ordered prior to appointment     Future Appointments   Date Time Provider Guille Ellen   1/3/2020  2:00 PM Trent Frias MD [facility] [place]   2/13/2020 10:15 AM Abigail Duane, PA-C [facility]

## 2020-01-02 NOTE — TELEPHONE ENCOUNTER
Patient is being referred for poss left elbow fracture.  Please add x-rays    Future Appointments   Date Time Provider Guille Hughes   1/3/2020  2:00 PM Princess Mcfadden MD [facility] [place]   2/13/2020 10:15 AM Halle Smith PA-C [facility]

## 2020-01-03 ENCOUNTER — OFFICE VISIT (OUTPATIENT)
Dept: ORTHOPEDICS CLINIC | Facility: CLINIC | Age: 45
End: 2020-01-03
Payer: MEDICAID

## 2020-01-03 VITALS — BODY MASS INDEX: 35.52 KG/M2 | HEART RATE: 67 BPM | HEIGHT: 73 IN | OXYGEN SATURATION: 98 % | WEIGHT: 268 LBS

## 2020-01-03 DIAGNOSIS — S52.022A CLOSED FRACTURE OF OLECRANON PROCESS OF LEFT ULNA, INITIAL ENCOUNTER: Primary | ICD-10-CM

## 2020-01-03 PROCEDURE — 99203 OFFICE O/P NEW LOW 30 MIN: CPT | Performed by: ORTHOPAEDIC SURGERY

## 2020-01-03 RX ORDER — TRAMADOL HYDROCHLORIDE 50 MG/1
TABLET ORAL EVERY 6 HOURS PRN
Qty: 20 TABLET | Refills: 0 | Status: SHIPPED | OUTPATIENT
Start: 2020-01-03 | End: 2020-01-09

## 2020-01-03 NOTE — H&P
[facility] New Patient Note    CC: Patient presents with:  Consult: left elbow injury. DOI:12/28/19- tripped over [employer].  right hand dominant. elevate. OTC Ibuprofen. seen at [facility] ER 12/28/19.        HPI: This 38year old male presents tablet 0   • atorvastatin 20 MG Oral Tab Take 1 tablet (20 mg total) by mouth nightly. 90 tablet 0   • Insulin Pen Needle (TRUEPLUS PEN NEEDLES) 32G X 4 MM Does not apply Misc Inject 1 each into the skin daily.  100 each 1     No Known Allergies  Family His nondisplaced fracture in an oblique angle through the olecranon. Assessment/Plan:  Diagnoses and all orders for this visit:    Closed fracture of olecranon process of left ulna, initial encounter  -     CT ELBOW LEFT (CPT=73200);  Future    Other order

## 2020-01-06 ENCOUNTER — HOSPITAL ENCOUNTER (OUTPATIENT)
Dept: CT IMAGING | Facility: HOSPITAL | Age: 45
Discharge: HOME | End: 2020-01-06
Attending: ORTHOPAEDIC SURGERY
Payer: MEDICAID

## 2020-01-06 ENCOUNTER — TELEPHONE (OUTPATIENT)
Dept: ORTHOPEDICS CLINIC | Facility: CLINIC | Age: 45
End: 2020-01-06

## 2020-01-06 DIAGNOSIS — S52.022A CLOSED FRACTURE OF OLECRANON PROCESS OF LEFT ULNA, INITIAL ENCOUNTER: ICD-10-CM

## 2020-01-06 PROCEDURE — 76376 3D RENDER W/INTRP POSTPROCES: CPT | Performed by: ORTHOPAEDIC SURGERY

## 2020-01-06 PROCEDURE — 73200 CT UPPER EXTREMITY W/O DYE: CPT | Performed by: ORTHOPAEDIC SURGERY

## 2020-01-07 ENCOUNTER — TELEPHONE (OUTPATIENT)
Dept: INTERNAL MEDICINE CLINIC | Facility: CLINIC | Age: 45
End: 2020-01-07

## 2020-01-07 DIAGNOSIS — S42.409D CLOSED FRACTURE OF ELBOW WITH ROUTINE HEALING, UNSPECIFIED LATERALITY, SUBSEQUENT ENCOUNTER: Primary | ICD-10-CM

## 2020-01-07 NOTE — TELEPHONE ENCOUNTER
Future Appointments   Date Time Provider Guille Ellen   1/8/2020 11:30 AM Paris Mcdaniel MD [facility] [place]   2/13/2020 10:15 AM Marcello Fitzpatrick PA-C [facility]

## 2020-01-07 NOTE — TELEPHONE ENCOUNTER
Kristi from Dr. Burgess Sales office stated our mutual pt needs a referral for one more appt - appt is scheduled for tomorrow at 1/8 at 11:30. Please advise.

## 2020-01-08 DIAGNOSIS — E11.65 TYPE 2 DIABETES MELLITUS WITH HYPERGLYCEMIA, WITHOUT LONG-TERM CURRENT USE OF INSULIN (HCC): ICD-10-CM

## 2020-01-08 RX ORDER — PEN NEEDLE, DIABETIC 32GX 5/32"
NEEDLE, DISPOSABLE MISCELLANEOUS
Qty: 100 EACH | Refills: 1 | Status: SHIPPED | OUTPATIENT
Start: 2020-01-08 | End: 2020-06-10

## 2020-01-08 NOTE — TELEPHONE ENCOUNTER
Last VISIT 12/12/19 TB  Future  VISIT 02/13/20 CB  Last REFILL 02/27/19 100 each 1 refill  Last LABS 12/11/19 Lipid, CMP    Per PROTOCOL? Passed     Please Approve or Deny.

## 2020-01-09 ENCOUNTER — OFFICE VISIT (OUTPATIENT)
Dept: ORTHOPEDICS CLINIC | Facility: CLINIC | Age: 45
End: 2020-01-09
Payer: MEDICAID

## 2020-01-09 VITALS — HEIGHT: 73 IN | WEIGHT: 268 LBS | OXYGEN SATURATION: 99 % | BODY MASS INDEX: 35.52 KG/M2 | HEART RATE: 85 BPM

## 2020-01-09 DIAGNOSIS — S52.022D CLOSED FRACTURE OF LEFT OLECRANON PROCESS WITH ROUTINE HEALING, SUBSEQUENT ENCOUNTER: Primary | ICD-10-CM

## 2020-01-09 PROCEDURE — 24670 CLTX ULNAR FX PROX W/O MNPJ: CPT | Performed by: ORTHOPAEDIC SURGERY

## 2020-01-09 NOTE — PROGRESS NOTES
[facility] Progress Note    Subjective: Patient returns to discuss left elbow. He did have a CT scan and is here to follow-up on results. He reports that pain has been much improved. He is not taking anything for pain currently.   There is no numbn healing in order to decrease risk of stiffness post traumatically. All questions answered. The above note was creating using Dragon speech recognition technology. Please excuse any typos.     MD Steven Cabrera [facility]

## 2020-01-10 ENCOUNTER — TELEPHONE (OUTPATIENT)
Dept: ORTHOPEDICS CLINIC | Facility: CLINIC | Age: 45
End: 2020-01-10

## 2020-01-10 NOTE — TELEPHONE ENCOUNTER
S/w Danay Patiño and informed him that per Dr. Kierra Foote there are no range of motion restrictions for the brace and that the brace can be left unlocked for full range of motion.  Pt was instructed to avoid active extension at the elbow and was informed that pass

## 2020-01-10 NOTE — TELEPHONE ENCOUNTER
Patient has his functional brace and needs to know the range of mobility that the brace needs to be set.  Please call patient

## 2020-01-13 ENCOUNTER — OFFICE VISIT (OUTPATIENT)
Dept: PHYSICAL THERAPY | Facility: HOSPITAL | Age: 45
End: 2020-01-13
Attending: ORTHOPAEDIC SURGERY
Payer: MEDICAID

## 2020-01-13 DIAGNOSIS — S52.022D CLOSED FRACTURE OF LEFT OLECRANON PROCESS WITH ROUTINE HEALING, SUBSEQUENT ENCOUNTER: ICD-10-CM

## 2020-01-13 PROCEDURE — 97110 THERAPEUTIC EXERCISES: CPT

## 2020-01-13 PROCEDURE — 97161 PT EVAL LOW COMPLEX 20 MIN: CPT

## 2020-01-13 NOTE — PROGRESS NOTES
ELBOW AND HAND EVALUATION:   Referring Physician: Dr. Corbin Mcardle  Diagnosis: closed fx of L olecranon process   Date of Service: 1/13/2020     PATIENT SUMMARY   Alma Delia Arroyo III is a 38year old male who presents to therapy today with complaints of tight Strength/MMT:   Elbow Wrist   Flexion: R 5/5; L 4/5  Extension: R 5/5; L NT  Supination: R 5/5; L 4/5  Pronation: R 5/5; L 4/5 Flexion: R 5/5; L 5/5  Extension: R 5/5; L 5/5  Ulnar Deviation: R 5/5; L 5/5  Radial Deviation R 5/5; L 5/5     Today’s Tr questions, please contact me at Dept: [phone]    Sincerely,  Electronically signed by therapist: Hannah Calderon, PT  [de-identified] certification required: Yes  I certify the need for these services furnished under this plan of treatment and while under m

## 2020-01-15 ENCOUNTER — TELEPHONE (OUTPATIENT)
Dept: INTERNAL MEDICINE CLINIC | Facility: CLINIC | Age: 45
End: 2020-01-15

## 2020-01-15 ENCOUNTER — OFFICE VISIT (OUTPATIENT)
Dept: PHYSICAL THERAPY | Facility: HOSPITAL | Age: 45
End: 2020-01-15
Attending: ORTHOPAEDIC SURGERY
Payer: MEDICAID

## 2020-01-15 DIAGNOSIS — S52.022D CLOSED FRACTURE OF LEFT OLECRANON PROCESS WITH ROUTINE HEALING, SUBSEQUENT ENCOUNTER: ICD-10-CM

## 2020-01-15 PROCEDURE — 97140 MANUAL THERAPY 1/> REGIONS: CPT

## 2020-01-15 PROCEDURE — 97016 VASOPNEUMATIC DEVICE THERAPY: CPT

## 2020-01-15 NOTE — PROGRESS NOTES
Progress Summary  Pt has attended 2,  visits in Physical Therapy. Subjective\": Yamilet Leash against something at work Tuesday and caught himself w/ arm.  Felt some stretch but not more painful    Assessment: demonstrating improved active elbow flexion and pa

## 2020-01-16 ENCOUNTER — HOSPITAL ENCOUNTER (OUTPATIENT)
Dept: GENERAL RADIOLOGY | Facility: HOSPITAL | Age: 45
Discharge: HOME | End: 2020-01-16
Attending: ORTHOPAEDIC SURGERY
Payer: MEDICAID

## 2020-01-16 DIAGNOSIS — S52.022D CLOSED FRACTURE OF LEFT OLECRANON PROCESS WITH ROUTINE HEALING, SUBSEQUENT ENCOUNTER: ICD-10-CM

## 2020-01-16 PROCEDURE — 73080 X-RAY EXAM OF ELBOW: CPT | Performed by: ORTHOPAEDIC SURGERY

## 2020-01-17 ENCOUNTER — OFFICE VISIT (OUTPATIENT)
Dept: ORTHOPEDICS CLINIC | Facility: CLINIC | Age: 45
End: 2020-01-17
Payer: MEDICAID

## 2020-01-17 VITALS
WEIGHT: 268 LBS | OXYGEN SATURATION: 98 % | HEIGHT: 73 IN | BODY MASS INDEX: 35.52 KG/M2 | HEART RATE: 80 BPM | RESPIRATION RATE: 16 BRPM

## 2020-01-17 DIAGNOSIS — S52.022D CLOSED FRACTURE OF LEFT OLECRANON PROCESS WITH ROUTINE HEALING, SUBSEQUENT ENCOUNTER: Primary | ICD-10-CM

## 2020-01-17 PROCEDURE — 99024 POSTOP FOLLOW-UP VISIT: CPT | Performed by: ORTHOPAEDIC SURGERY

## 2020-01-17 NOTE — PROGRESS NOTES
[facility] Progress Note    Subjective: Patient returns for evaluation of the left elbow. He has obtained the hinged brace DME. He has been using this at all times. He has started physical therapy prior to obtaining x-rays of the elbow yesterday.

## 2020-01-20 ENCOUNTER — TELEPHONE (OUTPATIENT)
Dept: PHYSICAL THERAPY | Facility: HOSPITAL | Age: 45
End: 2020-01-20

## 2020-01-20 ENCOUNTER — APPOINTMENT (OUTPATIENT)
Dept: PHYSICAL THERAPY | Facility: HOSPITAL | Age: 45
End: 2020-01-20
Attending: ORTHOPAEDIC SURGERY
Payer: MEDICAID

## 2020-01-21 DIAGNOSIS — I10 ESSENTIAL HYPERTENSION: ICD-10-CM

## 2020-01-21 RX ORDER — LISINOPRIL 10 MG/1
TABLET ORAL
Qty: 90 TABLET | Refills: 0 | OUTPATIENT
Start: 2020-01-21

## 2020-01-21 RX ORDER — METOPROLOL SUCCINATE 100 MG/1
TABLET, EXTENDED RELEASE ORAL
Qty: 90 TABLET | Refills: 1 | Status: SHIPPED | OUTPATIENT
Start: 2020-01-21 | End: 2020-07-10

## 2020-01-21 NOTE — TELEPHONE ENCOUNTER
Last VISIT 12/12/19    Last REFILL 10/30/19 qty 180 w/0 refills    Last LABS 12/11/19 CMP, Lipid done    Future Appointments   Date Time Provider Guille Hughes                                                    2/13/2020 10:15 AM [address] PA-C

## 2020-01-27 ENCOUNTER — OFFICE VISIT (OUTPATIENT)
Dept: PHYSICAL THERAPY | Facility: HOSPITAL | Age: 45
End: 2020-01-27
Attending: ORTHOPAEDIC SURGERY
Payer: MEDICAID

## 2020-01-27 ENCOUNTER — HOSPITAL ENCOUNTER (OUTPATIENT)
Dept: GENERAL RADIOLOGY | Facility: HOSPITAL | Age: 45
Discharge: HOME | End: 2020-01-27
Attending: ORTHOPAEDIC SURGERY
Payer: MEDICAID

## 2020-01-27 DIAGNOSIS — S52.022D CLOSED FRACTURE OF LEFT OLECRANON PROCESS WITH ROUTINE HEALING, SUBSEQUENT ENCOUNTER: ICD-10-CM

## 2020-01-27 PROCEDURE — 97110 THERAPEUTIC EXERCISES: CPT

## 2020-01-27 PROCEDURE — 73080 X-RAY EXAM OF ELBOW: CPT | Performed by: ORTHOPAEDIC SURGERY

## 2020-01-27 NOTE — PROGRESS NOTES
Pt has attended 3  visits in Physical Therapy. Subjective\": Missed last visits due to wife's health issues. Reports feels stiffer after wearing brace. Assessment: demonstrating improved active elbow flexion and passive extension.  Edema decreasin

## 2020-01-28 ENCOUNTER — TELEPHONE (OUTPATIENT)
Dept: ORTHOPEDICS CLINIC | Facility: CLINIC | Age: 45
End: 2020-01-28

## 2020-01-28 NOTE — TELEPHONE ENCOUNTER
No answer - left voicemail. X-rays show maintained fracture alignment. Plan to continue nonoperative treatment as previously discussed.     Please call patient to set up follow up appt for 2 weeks - he can obtain elbow x-rays first out of the brace, then co

## 2020-01-29 ENCOUNTER — OFFICE VISIT (OUTPATIENT)
Dept: PHYSICAL THERAPY | Facility: HOSPITAL | Age: 45
End: 2020-01-29
Attending: ORTHOPAEDIC SURGERY
Payer: MEDICAID

## 2020-01-29 DIAGNOSIS — S52.022D CLOSED FRACTURE OF LEFT OLECRANON PROCESS WITH ROUTINE HEALING, SUBSEQUENT ENCOUNTER: ICD-10-CM

## 2020-01-29 PROCEDURE — 97110 THERAPEUTIC EXERCISES: CPT

## 2020-01-29 NOTE — PROGRESS NOTES
Pt has attended 4 visits in Physical Therapy. Subjective\": did not have the opportunity to do ex this a.m. Assessment:w/ minimal facilitation, achieving functional ROM of 0-140 degrees.  Ready to cont HEP for ROM and elbow flex, wrist, hand stren

## 2020-02-03 ENCOUNTER — APPOINTMENT (OUTPATIENT)
Dept: PHYSICAL THERAPY | Facility: HOSPITAL | Age: 45
End: 2020-02-03
Attending: ORTHOPAEDIC SURGERY
Payer: MEDICAID

## 2020-02-03 RX ORDER — ATORVASTATIN CALCIUM 20 MG/1
TABLET, FILM COATED ORAL
Qty: 90 TABLET | Refills: 0 | Status: SHIPPED | OUTPATIENT
Start: 2020-02-03 | End: 2020-04-20

## 2020-02-04 DIAGNOSIS — E11.21 TYPE 2 DIABETES MELLITUS WITH DIABETIC NEPHROPATHY, WITHOUT LONG-TERM CURRENT USE OF INSULIN (HCC): ICD-10-CM

## 2020-02-04 NOTE — TELEPHONE ENCOUNTER
Last Ov: 12/12/19, CB, DM f/u  Upcoming appt: 2/13/20, CB  Last labs: CMP, Lipid 12/11/19  Last Rx: victoza 18mg/3mL, 9mL, 0R 12/30/19    Per Protocol, failed. Last A1c out of range. Rx pending.

## 2020-02-05 ENCOUNTER — APPOINTMENT (OUTPATIENT)
Dept: PHYSICAL THERAPY | Facility: HOSPITAL | Age: 45
End: 2020-02-05
Attending: ORTHOPAEDIC SURGERY
Payer: MEDICAID

## 2020-02-10 ENCOUNTER — APPOINTMENT (OUTPATIENT)
Dept: PHYSICAL THERAPY | Facility: HOSPITAL | Age: 45
End: 2020-02-10
Attending: ORTHOPAEDIC SURGERY
Payer: MEDICAID

## 2020-02-12 ENCOUNTER — APPOINTMENT (OUTPATIENT)
Dept: PHYSICAL THERAPY | Facility: HOSPITAL | Age: 45
End: 2020-02-12
Attending: ORTHOPAEDIC SURGERY
Payer: MEDICAID

## 2020-02-12 ENCOUNTER — HOSPITAL ENCOUNTER (OUTPATIENT)
Dept: GENERAL RADIOLOGY | Facility: HOSPITAL | Age: 45
Discharge: HOME | End: 2020-02-12
Attending: ORTHOPAEDIC SURGERY
Payer: MEDICAID

## 2020-02-12 DIAGNOSIS — S52.022D CLOSED FRACTURE OF LEFT OLECRANON PROCESS WITH ROUTINE HEALING, SUBSEQUENT ENCOUNTER: ICD-10-CM

## 2020-02-12 PROCEDURE — 73080 X-RAY EXAM OF ELBOW: CPT | Performed by: ORTHOPAEDIC SURGERY

## 2020-02-13 ENCOUNTER — OFFICE VISIT (OUTPATIENT)
Dept: INTERNAL MEDICINE CLINIC | Facility: CLINIC | Age: 45
End: 2020-02-13
Payer: MEDICAID

## 2020-02-13 ENCOUNTER — APPOINTMENT (OUTPATIENT)
Dept: LAB | Age: 45
End: 2020-02-13
Attending: PHYSICIAN ASSISTANT
Payer: MEDICAID

## 2020-02-13 VITALS
DIASTOLIC BLOOD PRESSURE: 98 MMHG | OXYGEN SATURATION: 99 % | HEIGHT: 73 IN | TEMPERATURE: 98 F | RESPIRATION RATE: 16 BRPM | BODY MASS INDEX: 37.24 KG/M2 | WEIGHT: 281 LBS | HEART RATE: 80 BPM | SYSTOLIC BLOOD PRESSURE: 144 MMHG

## 2020-02-13 DIAGNOSIS — E78.5 HYPERLIPIDEMIA LDL GOAL <100: ICD-10-CM

## 2020-02-13 DIAGNOSIS — N52.9 ERECTILE DYSFUNCTION, UNSPECIFIED ERECTILE DYSFUNCTION TYPE: ICD-10-CM

## 2020-02-13 DIAGNOSIS — I10 ESSENTIAL HYPERTENSION: ICD-10-CM

## 2020-02-13 DIAGNOSIS — E11.21 TYPE 2 DIABETES MELLITUS WITH DIABETIC NEPHROPATHY, WITHOUT LONG-TERM CURRENT USE OF INSULIN (HCC): Primary | ICD-10-CM

## 2020-02-13 DIAGNOSIS — E11.21 TYPE 2 DIABETES MELLITUS WITH DIABETIC NEPHROPATHY, WITHOUT LONG-TERM CURRENT USE OF INSULIN (HCC): ICD-10-CM

## 2020-02-13 DIAGNOSIS — R94.31 ABNORMAL EKG: ICD-10-CM

## 2020-02-13 LAB
ALBUMIN SERPL-MCNC: 3.6 G/DL (ref 3.4–5)
ALBUMIN/GLOB SERPL: 0.9 {RATIO} (ref 1–2)
ALP LIVER SERPL-CCNC: 80 U/L (ref 45–117)
ALT SERPL-CCNC: 31 U/L (ref 16–61)
ANION GAP SERPL CALC-SCNC: 4 MMOL/L (ref 0–18)
AST SERPL-CCNC: 60 U/L (ref 15–37)
BILIRUB SERPL-MCNC: 1 MG/DL (ref 0.1–2)
BUN BLD-MCNC: 12 MG/DL (ref 7–18)
BUN/CREAT SERPL: 12.9 (ref 10–20)
CALCIUM BLD-MCNC: 8.8 MG/DL (ref 8.5–10.1)
CHLORIDE SERPL-SCNC: 103 MMOL/L (ref 98–112)
CO2 SERPL-SCNC: 30 MMOL/L (ref 21–32)
CREAT BLD-MCNC: 0.93 MG/DL (ref 0.7–1.3)
CREAT UR-SCNC: 97.1 MG/DL
EST. AVERAGE GLUCOSE BLD GHB EST-MCNC: 140 MG/DL (ref 68–126)
GLOBULIN PLAS-MCNC: 4.1 G/DL (ref 2.8–4.4)
GLUCOSE BLD-MCNC: 136 MG/DL (ref 70–99)
HBA1C MFR BLD HPLC: 6.5 % (ref ?–5.7)
M PROTEIN MFR SERPL ELPH: 7.7 G/DL (ref 6.4–8.2)
MICROALBUMIN UR-MCNC: 52.7 MG/DL
MICROALBUMIN/CREAT 24H UR-RTO: 542.7 UG/MG (ref ?–30)
OSMOLALITY SERPL CALC.SUM OF ELEC: 286 MOSM/KG (ref 275–295)
PATIENT FASTING Y/N/NP: NO
POTASSIUM SERPL-SCNC: 3.7 MMOL/L (ref 3.5–5.1)
SODIUM SERPL-SCNC: 137 MMOL/L (ref 136–145)

## 2020-02-13 PROCEDURE — 82570 ASSAY OF URINE CREATININE: CPT

## 2020-02-13 PROCEDURE — 83036 HEMOGLOBIN GLYCOSYLATED A1C: CPT

## 2020-02-13 PROCEDURE — 93000 ELECTROCARDIOGRAM COMPLETE: CPT | Performed by: PHYSICIAN ASSISTANT

## 2020-02-13 PROCEDURE — 99214 OFFICE O/P EST MOD 30 MIN: CPT | Performed by: PHYSICIAN ASSISTANT

## 2020-02-13 PROCEDURE — 82043 UR ALBUMIN QUANTITATIVE: CPT

## 2020-02-13 PROCEDURE — 80053 COMPREHEN METABOLIC PANEL: CPT

## 2020-02-13 RX ORDER — LISINOPRIL AND HYDROCHLOROTHIAZIDE 20; 12.5 MG/1; MG/1
1 TABLET ORAL DAILY
Qty: 30 TABLET | Refills: 1 | Status: SHIPPED | OUTPATIENT
Start: 2020-02-13 | End: 2020-03-25

## 2020-02-13 NOTE — PROGRESS NOTES
Patient presents with: Follow - Up: RG rm 6 2 month f/u htn and diabetes      HPI:  Pt presents for follow up of HTN and DM. HTN - Pt reports compliance with meds. Has not been exercising as much due to broken elbow.   Some weight gain but states his wa MM Does not apply Misc USE TO INJECT INTO THE SKIN DAILY 100 each 1   • ONETOUCH DELICA LANCETS 40D Does not apply Misc TEST BLOOD SUGAR ONCE DAILY 100 each 1       Physical Exam  BP (!) 144/98 (BP Location: Right arm, Patient Position: Sitting, Cuff Size: Consults:  ELECTROCARDIOGRAM, COMPLETE  CARD ECHO STRESS ECHO/REST AND STRESS(CPT=93350/63153 [facility] [place] [address])    Return in about 6 weeks (around 3/26/2020) for BP and ED. There are no Patient Instructions on file for this visit.     All questions were answered

## 2020-02-17 ENCOUNTER — TELEPHONE (OUTPATIENT)
Dept: PHYSICAL THERAPY | Facility: HOSPITAL | Age: 45
End: 2020-02-17

## 2020-03-04 DIAGNOSIS — E11.21 TYPE 2 DIABETES MELLITUS WITH DIABETIC NEPHROPATHY, WITHOUT LONG-TERM CURRENT USE OF INSULIN (HCC): ICD-10-CM

## 2020-03-21 DIAGNOSIS — E11.21 TYPE 2 DIABETES MELLITUS WITH DIABETIC NEPHROPATHY, WITHOUT LONG-TERM CURRENT USE OF INSULIN (HCC): ICD-10-CM

## 2020-03-23 NOTE — TELEPHONE ENCOUNTER
LOV:2/13/20 CB  FOV:none on file   LAST RX:3/5/20 administer 1.8 mg under the skin daily 9 ml 0 refills   LAST LABS:2/13/20 cmp,a1c,microalb  PER PROTOCOL: to provider

## 2020-03-24 DIAGNOSIS — I10 ESSENTIAL HYPERTENSION: ICD-10-CM

## 2020-03-24 RX ORDER — LANCETS 33 GAUGE
EACH MISCELLANEOUS
Qty: 100 EACH | Refills: 2 | Status: SHIPPED | OUTPATIENT
Start: 2020-03-24 | End: 2021-05-25

## 2020-03-25 RX ORDER — LISINOPRIL AND HYDROCHLOROTHIAZIDE 20; 12.5 MG/1; MG/1
1 TABLET ORAL DAILY
Qty: 90 TABLET | Refills: 0 | Status: SHIPPED | OUTPATIENT
Start: 2020-03-25 | End: 2020-07-10

## 2020-03-25 NOTE — TELEPHONE ENCOUNTER
LOV:2/13/20 CB  FOV:none on file   LAST RX:2/13/20 20-12.5 mg take 1 tab daily 30 tabs 1 refill   LAST LABS:2/13/20 cmp,a1c,microalb  PER PROTOCOL: to provider

## 2020-04-20 RX ORDER — ATORVASTATIN CALCIUM 20 MG/1
TABLET, FILM COATED ORAL
Qty: 90 TABLET | Refills: 0 | Status: SHIPPED | OUTPATIENT
Start: 2020-04-20 | End: 2020-07-10

## 2020-06-10 DIAGNOSIS — E11.65 TYPE 2 DIABETES MELLITUS WITH HYPERGLYCEMIA, WITHOUT LONG-TERM CURRENT USE OF INSULIN (HCC): ICD-10-CM

## 2020-06-10 DIAGNOSIS — E11.21 TYPE 2 DIABETES MELLITUS WITH DIABETIC NEPHROPATHY, WITHOUT LONG-TERM CURRENT USE OF INSULIN (HCC): ICD-10-CM

## 2020-06-11 NOTE — TELEPHONE ENCOUNTER
LOV:2/13/20, DM, CB  Last CPE:10/30/19, CPE, CB  Last refill:  Liraglutide (VICTOZA) 18 MG/3ML Subcutaneous Solution Pen-injector 9 mL 1 3/23/2020     TRUEPLUS PEN NEEDLES 32G X 4 MM Does not apply Misc 100 each 1 1/8/2020     Last labs that are related: c

## 2020-06-17 NOTE — TELEPHONE ENCOUNTER
I called pt and he said he is out of town on quarantine and will not be back until sometime in July so he will call us when he gets back

## 2020-07-10 DIAGNOSIS — I10 ESSENTIAL HYPERTENSION: ICD-10-CM

## 2020-07-10 RX ORDER — ATORVASTATIN CALCIUM 20 MG/1
TABLET, FILM COATED ORAL
Qty: 90 TABLET | Refills: 0 | Status: SHIPPED | OUTPATIENT
Start: 2020-07-10 | End: 2020-10-08

## 2020-07-10 RX ORDER — METOPROLOL SUCCINATE 100 MG/1
TABLET, EXTENDED RELEASE ORAL
Qty: 90 TABLET | Refills: 0 | Status: SHIPPED | OUTPATIENT
Start: 2020-07-10 | End: 2020-09-29

## 2020-07-10 RX ORDER — LISINOPRIL AND HYDROCHLOROTHIAZIDE 20; 12.5 MG/1; MG/1
1 TABLET ORAL DAILY
Qty: 90 TABLET | Refills: 0 | Status: SHIPPED | OUTPATIENT
Start: 2020-07-10 | End: 2020-09-29

## 2020-08-09 DIAGNOSIS — E11.21 TYPE 2 DIABETES MELLITUS WITH DIABETIC NEPHROPATHY, WITHOUT LONG-TERM CURRENT USE OF INSULIN (HCC): ICD-10-CM

## 2020-08-10 DIAGNOSIS — E11.21 TYPE 2 DIABETES MELLITUS WITH DIABETIC NEPHROPATHY, WITHOUT LONG-TERM CURRENT USE OF INSULIN (HCC): ICD-10-CM

## 2020-08-10 NOTE — TELEPHONE ENCOUNTER
Will need additional information. Are you saying the fax said I could not prescribe his Victoza? You will need to contact pharmacy as I do have the authority to prescribe Victoza. Or am I missing some information?

## 2020-08-10 NOTE — TELEPHONE ENCOUNTER
Spoke with pharmacy tech, states that denial received by [employer] is not contracted with insurance plan\"    Explained to pharmacy tech that this provider does have authority to fill prescriptions and has, but states insuran

## 2020-08-10 NOTE — TELEPHONE ENCOUNTER
Issac Walker or Timbo Del Valle - Could you look into this? I've never been denied prescribing a medication by Medicaid before. Meanwhile please ask TB if she will sign the Rx till we figure out what the problem is? She has seen this pt in the past as well.   Paul leal

## 2020-09-29 DIAGNOSIS — I10 ESSENTIAL HYPERTENSION: ICD-10-CM

## 2020-09-29 DIAGNOSIS — E11.21 TYPE 2 DIABETES MELLITUS WITH DIABETIC NEPHROPATHY, WITHOUT LONG-TERM CURRENT USE OF INSULIN (HCC): Primary | ICD-10-CM

## 2020-09-29 RX ORDER — METOPROLOL SUCCINATE 100 MG/1
TABLET, EXTENDED RELEASE ORAL
Qty: 30 TABLET | Refills: 0 | Status: SHIPPED | OUTPATIENT
Start: 2020-09-29 | End: 2020-11-23

## 2020-09-29 RX ORDER — LISINOPRIL AND HYDROCHLOROTHIAZIDE 20; 12.5 MG/1; MG/1
1 TABLET ORAL DAILY
Qty: 30 TABLET | Refills: 0 | Status: SHIPPED | OUTPATIENT
Start: 2020-09-29 | End: 2020-11-23

## 2020-09-29 NOTE — TELEPHONE ENCOUNTER
Last Ov:2/13/20  Upcoming appt:none  Last labs:2/13/20 A1C, cmp  Last Rx:7/10/20 lisinopril HCTZ, metformin HCL 500mg, metoprolol succinate ER 100mg    Per Protocol sent for review

## 2020-09-29 NOTE — TELEPHONE ENCOUNTER
I sent 30 day supply of medication as pt is well overdue for OV. Please call and remind him to schedule. I also placed orders for fasting labs for pt to do prior to OV. Please instruct him in how to schedule labs as no walk ins any more due to COVID.

## 2020-10-08 RX ORDER — ATORVASTATIN CALCIUM 20 MG/1
TABLET, FILM COATED ORAL
Qty: 90 TABLET | Refills: 0 | Status: SHIPPED | OUTPATIENT
Start: 2020-10-08 | End: 2021-01-07

## 2020-11-07 DIAGNOSIS — E11.21 TYPE 2 DIABETES MELLITUS WITH DIABETIC NEPHROPATHY, WITHOUT LONG-TERM CURRENT USE OF INSULIN (HCC): ICD-10-CM

## 2020-11-09 RX ORDER — LIRAGLUTIDE 6 MG/ML
INJECTION SUBCUTANEOUS
Qty: 9 ML | Refills: 0 | Status: SHIPPED | OUTPATIENT
Start: 2020-11-09 | End: 2020-12-07

## 2020-11-09 NOTE — TELEPHONE ENCOUNTER
Last VISIT  2/13/20 CB DM     Last REFILL 8/10/20 Victoza 9mL 0R    Last LABS  2/13/20 MA/Creat, HgA1c, CMP    No future appointments. Per PROTOCOL?  Diabetic protocol failed, Route to provider       HgBA1C procedure resulted in past 6 months     Last

## 2020-11-09 NOTE — TELEPHONE ENCOUNTER
Please remind pt due for labs (were ordered in Sept) and for OV. Needs to schedule soon. 30 min please.

## 2020-11-23 DIAGNOSIS — I10 ESSENTIAL HYPERTENSION: ICD-10-CM

## 2020-11-23 RX ORDER — LISINOPRIL AND HYDROCHLOROTHIAZIDE 20; 12.5 MG/1; MG/1
1 TABLET ORAL DAILY
Qty: 30 TABLET | Refills: 0 | Status: SHIPPED | OUTPATIENT
Start: 2020-11-23 | End: 2020-12-24

## 2020-11-23 RX ORDER — METOPROLOL SUCCINATE 100 MG/1
TABLET, EXTENDED RELEASE ORAL
Qty: 30 TABLET | Refills: 0 | Status: SHIPPED | OUTPATIENT
Start: 2020-11-23 | End: 2020-12-24

## 2020-12-07 DIAGNOSIS — E11.21 TYPE 2 DIABETES MELLITUS WITH DIABETIC NEPHROPATHY, WITHOUT LONG-TERM CURRENT USE OF INSULIN (HCC): ICD-10-CM

## 2020-12-07 RX ORDER — LIRAGLUTIDE 6 MG/ML
INJECTION SUBCUTANEOUS
Qty: 9 ML | Refills: 0 | Status: SHIPPED | OUTPATIENT
Start: 2020-12-07 | End: 2021-01-07

## 2020-12-07 NOTE — TELEPHONE ENCOUNTER
Last Ov: 2/13/20, CB, F/U  Last labs: CMP, A1c, Microalb/creat 2/13/20  Last Rx: Victoza 18mg/3mL, 9mL, 0R 11/9/20    Future Appointments   Date Time Provider Guille Hughes   12/9/2020  7:30 AM Mikayla Hernandez PA-C [facility]   12/9/2020  8

## 2020-12-09 ENCOUNTER — LAB ENCOUNTER (OUTPATIENT)
Dept: LAB | Age: 45
End: 2020-12-09
Attending: PHYSICIAN ASSISTANT
Payer: MEDICAID

## 2020-12-09 ENCOUNTER — OFFICE VISIT (OUTPATIENT)
Dept: INTERNAL MEDICINE CLINIC | Facility: CLINIC | Age: 45
End: 2020-12-09
Payer: MEDICAID

## 2020-12-09 VITALS
HEART RATE: 78 BPM | WEIGHT: 298 LBS | SYSTOLIC BLOOD PRESSURE: 134 MMHG | OXYGEN SATURATION: 97 % | DIASTOLIC BLOOD PRESSURE: 85 MMHG | TEMPERATURE: 98 F | BODY MASS INDEX: 39 KG/M2

## 2020-12-09 DIAGNOSIS — E78.5 HYPERLIPIDEMIA LDL GOAL <100: ICD-10-CM

## 2020-12-09 DIAGNOSIS — E11.21 TYPE 2 DIABETES MELLITUS WITH DIABETIC NEPHROPATHY, WITHOUT LONG-TERM CURRENT USE OF INSULIN (HCC): ICD-10-CM

## 2020-12-09 DIAGNOSIS — I10 ESSENTIAL HYPERTENSION: ICD-10-CM

## 2020-12-09 DIAGNOSIS — Z13.0 SCREENING FOR BLOOD DISEASE: ICD-10-CM

## 2020-12-09 DIAGNOSIS — E11.42 DIABETIC POLYNEUROPATHY ASSOCIATED WITH TYPE 2 DIABETES MELLITUS (HCC): ICD-10-CM

## 2020-12-09 DIAGNOSIS — E11.21 TYPE 2 DIABETES MELLITUS WITH DIABETIC NEPHROPATHY, WITHOUT LONG-TERM CURRENT USE OF INSULIN (HCC): Primary | ICD-10-CM

## 2020-12-09 PROCEDURE — 99214 OFFICE O/P EST MOD 30 MIN: CPT | Performed by: PHYSICIAN ASSISTANT

## 2020-12-09 PROCEDURE — 3079F DIAST BP 80-89 MM HG: CPT | Performed by: PHYSICIAN ASSISTANT

## 2020-12-09 PROCEDURE — 80061 LIPID PANEL: CPT

## 2020-12-09 PROCEDURE — 80053 COMPREHEN METABOLIC PANEL: CPT

## 2020-12-09 PROCEDURE — 82570 ASSAY OF URINE CREATININE: CPT

## 2020-12-09 PROCEDURE — 85025 COMPLETE CBC W/AUTO DIFF WBC: CPT

## 2020-12-09 PROCEDURE — 83036 HEMOGLOBIN GLYCOSYLATED A1C: CPT

## 2020-12-09 PROCEDURE — 36415 COLL VENOUS BLD VENIPUNCTURE: CPT

## 2020-12-09 PROCEDURE — 3075F SYST BP GE 130 - 139MM HG: CPT | Performed by: PHYSICIAN ASSISTANT

## 2020-12-09 PROCEDURE — 82043 UR ALBUMIN QUANTITATIVE: CPT

## 2020-12-09 NOTE — PROGRESS NOTES
Patient presents with: Follow - Up: LILA rm 6      HPI:  Pt presents for follow up of DM II, HTN, chol. DM II - Admits to poor diet over COVID. Was eating more candy, has gained about 20# this year. Is now back to eating better over the last month. (135.2 kg)   SpO2 97%   BMI 39.32 kg/m²   Constitutional:  No distress. HEENT:  Normocephalic and atraumatic. Tympanic membranes normal.  Nose normal. Oropharynx is clear and moist.   Eyes: Conjunctivae are normal. PERRLA. Neck: Normal range of motion.

## 2020-12-10 ENCOUNTER — TELEPHONE (OUTPATIENT)
Dept: ENDOCRINOLOGY CLINIC | Facility: CLINIC | Age: 45
End: 2020-12-10

## 2020-12-10 DIAGNOSIS — E11.21 TYPE 2 DIABETES MELLITUS WITH DIABETIC NEPHROPATHY, WITHOUT LONG-TERM CURRENT USE OF INSULIN (HCC): Primary | ICD-10-CM

## 2020-12-10 NOTE — PROGRESS NOTES
Calcium is a little low. DM is not well controlled. See OV note from yesterday. Pt already working on compliance with meds and diet. DM Ed referral made yesterday as well. Will need to recheck labs in 3 mos. I have ordered.   Abnormal Micro/Cr but imp

## 2020-12-18 ENCOUNTER — TELEMEDICINE (OUTPATIENT)
Dept: ENDOCRINOLOGY CLINIC | Facility: CLINIC | Age: 45
End: 2020-12-18

## 2020-12-18 DIAGNOSIS — E11.65 TYPE 2 DIABETES MELLITUS WITH HYPERGLYCEMIA, WITHOUT LONG-TERM CURRENT USE OF INSULIN (HCC): Primary | ICD-10-CM

## 2020-12-18 PROCEDURE — 99211 OFF/OP EST MAY X REQ PHY/QHP: CPT | Performed by: DIETITIAN, REGISTERED

## 2020-12-18 NOTE — PROGRESS NOTES
Lopez Stratton III   3/25/1975 was seen for Diabetic Medical Nutrition Therapy:    12/18/2020  Referring Provider: Cliff Duggan  Start time: 8:00 End time: 9:00    Due to COVID-19 ACTION PLAN, the patient's office visit was conducted via real-time interact Type 2 diabetes and ways to slow progression: regular physical activity, good blood glucose control and avoid weight gain/possibly reduce weight. Taught label reading: focus on counting grams of carb rather than looking at sugar.     Reviewed current carlos

## 2020-12-23 DIAGNOSIS — I10 ESSENTIAL HYPERTENSION: ICD-10-CM

## 2020-12-24 RX ORDER — METOPROLOL SUCCINATE 100 MG/1
TABLET, EXTENDED RELEASE ORAL
Qty: 30 TABLET | Refills: 0 | Status: SHIPPED | OUTPATIENT
Start: 2020-12-24 | End: 2021-01-13

## 2020-12-24 RX ORDER — LISINOPRIL AND HYDROCHLOROTHIAZIDE 20; 12.5 MG/1; MG/1
1 TABLET ORAL DAILY
Qty: 30 TABLET | Refills: 0 | Status: SHIPPED | OUTPATIENT
Start: 2020-12-24 | End: 2021-01-13

## 2020-12-24 NOTE — TELEPHONE ENCOUNTER
LOV:12/09/20, DM, CB  Last CPE:10/30/19, CPE, CB  Last refill:  METOPROLOL SUCCINATE  MG Oral Tablet 24 Hr 30 tablet 0 11/23/2020     LISINOPRIL-HYDROCHLOROTHIAZIDE 20-12.5 MG Oral Tab 30 tablet 0 11/23/2020     METFORMIN  MG Oral Tab 60 table

## 2021-01-07 DIAGNOSIS — E11.21 TYPE 2 DIABETES MELLITUS WITH DIABETIC NEPHROPATHY, WITHOUT LONG-TERM CURRENT USE OF INSULIN (HCC): ICD-10-CM

## 2021-01-07 RX ORDER — ATORVASTATIN CALCIUM 20 MG/1
TABLET, FILM COATED ORAL
Qty: 90 TABLET | Refills: 0 | Status: SHIPPED | OUTPATIENT
Start: 2021-01-07 | End: 2021-03-02

## 2021-01-07 RX ORDER — LIRAGLUTIDE 6 MG/ML
INJECTION SUBCUTANEOUS
Qty: 9 ML | Refills: 0 | Status: SHIPPED | OUTPATIENT
Start: 2021-01-07 | End: 2021-02-08

## 2021-01-07 NOTE — TELEPHONE ENCOUNTER
Last VISIT  12/9/20 CB DM    Last REFILL 12/7/20 Victoza 9ML 0R, 10/8/20 90T 0R    Last LABS  12/9/20 CBC, MA/Creat, HgA1c, Lipid, CMP    Future Appointments   Date Time Provider Guille Hughes   1/22/2021  7:30 AM Bria Morales, RD,LDN,CDE [facility]

## 2021-01-13 DIAGNOSIS — I10 ESSENTIAL HYPERTENSION: ICD-10-CM

## 2021-01-13 RX ORDER — LISINOPRIL AND HYDROCHLOROTHIAZIDE 20; 12.5 MG/1; MG/1
1 TABLET ORAL DAILY
Qty: 30 TABLET | Refills: 0 | Status: SHIPPED | OUTPATIENT
Start: 2021-01-13 | End: 2021-02-09

## 2021-01-13 RX ORDER — METOPROLOL SUCCINATE 100 MG/1
TABLET, EXTENDED RELEASE ORAL
Qty: 30 TABLET | Refills: 0 | Status: SHIPPED | OUTPATIENT
Start: 2021-01-13 | End: 2021-02-09

## 2021-01-13 NOTE — TELEPHONE ENCOUNTER
Last Ov:12/9/20  Upcoming appt:none  Last labs:12/9/20 cbc, a1c, lipid, cmp  Last Rx:12/24/20 metformin 500mg    Per Protocol sent for review

## 2021-01-14 ENCOUNTER — TELEPHONE (OUTPATIENT)
Dept: ORTHOPEDICS CLINIC | Facility: CLINIC | Age: 46
End: 2021-01-14

## 2021-01-14 DIAGNOSIS — M25.522 LEFT ELBOW PAIN: Primary | ICD-10-CM

## 2021-01-27 ENCOUNTER — TELEPHONE (OUTPATIENT)
Dept: INTERNAL MEDICINE CLINIC | Facility: CLINIC | Age: 46
End: 2021-01-27

## 2021-01-27 NOTE — TELEPHONE ENCOUNTER
HM gaps include A1c control and retinal exam due    Per CB last note pt due for CPE  FWD to PSR, please assist in scheduling

## 2021-02-06 DIAGNOSIS — E11.21 TYPE 2 DIABETES MELLITUS WITH DIABETIC NEPHROPATHY, WITHOUT LONG-TERM CURRENT USE OF INSULIN (HCC): ICD-10-CM

## 2021-02-08 DIAGNOSIS — I10 ESSENTIAL HYPERTENSION: ICD-10-CM

## 2021-02-08 RX ORDER — LIRAGLUTIDE 6 MG/ML
INJECTION SUBCUTANEOUS
Qty: 9 ML | Refills: 0 | Status: SHIPPED | OUTPATIENT
Start: 2021-02-08 | End: 2021-03-16

## 2021-02-08 NOTE — TELEPHONE ENCOUNTER
Pt due for CPE. Please continue to try to contact pt to schedule.   If he prefers to schedule in March for CPE and DM follow up OK with me and he should have labs done prior to that visit (after 3/9/21)  Thx.

## 2021-02-08 NOTE — TELEPHONE ENCOUNTER
Last VISIT 12/09/20    Last REFILL 01/07/21 qty 9mL w/0 refill    Last LABS 12/09/20 CBC, CMP, Lipid, A1c, Microalb done    No future appointments. Per PROTOCOL? Failed       Please Approve or Deny.

## 2021-02-09 RX ORDER — LISINOPRIL AND HYDROCHLOROTHIAZIDE 20; 12.5 MG/1; MG/1
1 TABLET ORAL DAILY
Qty: 30 TABLET | Refills: 0 | Status: SHIPPED | OUTPATIENT
Start: 2021-02-09 | End: 2021-03-16

## 2021-02-09 RX ORDER — METOPROLOL SUCCINATE 100 MG/1
TABLET, EXTENDED RELEASE ORAL
Qty: 30 TABLET | Refills: 0 | Status: SHIPPED | OUTPATIENT
Start: 2021-02-09 | End: 2021-03-16

## 2021-02-09 NOTE — TELEPHONE ENCOUNTER
Last Ov:12/9/20  Upcoming appt:3/24/21  Last labs:12/9/20 cbc, a1c, lipid, cmp  Last Rx:1/13/21 metformin    Per Protocol sent for review

## 2021-02-11 NOTE — TELEPHONE ENCOUNTER
cpe and lab appts scheduled  Future Appointments   Date Time Provider Guille Hughes   3/19/2021  7:45 AM REFERENCE [id number]   3/24/2021  7:30 AM John Mccloud PA-C [facility]

## 2021-03-02 RX ORDER — ATORVASTATIN CALCIUM 20 MG/1
20 TABLET, FILM COATED ORAL NIGHTLY
Qty: 90 TABLET | Refills: 0 | Status: SHIPPED | OUTPATIENT
Start: 2021-03-02 | End: 2021-07-14

## 2021-03-02 NOTE — TELEPHONE ENCOUNTER
Atorvastatin was sent in January for a 90 day supply. Pharmacy stating they did not receive order. New order placed.

## 2021-03-02 NOTE — TELEPHONE ENCOUNTER
Pt stated pharm has sent us 2 times for refill of ATORVASTATIN 20 MG Oral Tab #90 and we have not received it-please send in new rx to local [facility]

## 2021-03-05 ENCOUNTER — TELEPHONE (OUTPATIENT)
Dept: INTERNAL MEDICINE CLINIC | Facility: CLINIC | Age: 46
End: 2021-03-05

## 2021-03-05 NOTE — TELEPHONE ENCOUNTER
Patient states he has pre-existing conditions and is wondering if he should even get the Covid Vaccine; Pfiser/Moderna? ?  Please advise

## 2021-03-05 NOTE — TELEPHONE ENCOUNTER
He should get the COVID vaccine unless he has had a severe reaction to a vaccine that I am not aware of.

## 2021-03-05 NOTE — TELEPHONE ENCOUNTER
Patient has not had a severe reaction or allergy not indicated. Pt notified recommended he have the COVID vaccine. ROB to PILI.

## 2021-03-05 NOTE — TELEPHONE ENCOUNTER
Patient notified he should get the COVID vaccine, NKA. Pt verbalizes understanding. ROB to PILI.   LOV 12/9/20 with PILI

## 2021-03-16 DIAGNOSIS — E11.21 TYPE 2 DIABETES MELLITUS WITH DIABETIC NEPHROPATHY, WITHOUT LONG-TERM CURRENT USE OF INSULIN (HCC): ICD-10-CM

## 2021-03-16 DIAGNOSIS — I10 ESSENTIAL HYPERTENSION: ICD-10-CM

## 2021-03-16 RX ORDER — METOPROLOL SUCCINATE 100 MG/1
100 TABLET, EXTENDED RELEASE ORAL DAILY
Qty: 90 TABLET | Refills: 0 | Status: SHIPPED | OUTPATIENT
Start: 2021-03-16 | End: 2021-05-25

## 2021-03-16 RX ORDER — LISINOPRIL AND HYDROCHLOROTHIAZIDE 20; 12.5 MG/1; MG/1
1 TABLET ORAL DAILY
Qty: 90 TABLET | Refills: 0 | Status: SHIPPED | OUTPATIENT
Start: 2021-03-16 | End: 2021-05-25

## 2021-03-16 NOTE — TELEPHONE ENCOUNTER
Last Ov:12/9/20  Upcoming appt:3/24/21  Last labs:12/9/20 cbc, a1c, lipid, cmp  Last Rx:2/9/21 metformin, 2/8/21 victoza    Per Protocol sent for review

## 2021-03-17 RX ORDER — LIRAGLUTIDE 6 MG/ML
1.8 INJECTION SUBCUTANEOUS DAILY
Qty: 27 ML | Refills: 0 | Status: SHIPPED | OUTPATIENT
Start: 2021-03-17 | End: 2021-06-15

## 2021-03-19 ENCOUNTER — LAB ENCOUNTER (OUTPATIENT)
Dept: LAB | Age: 46
End: 2021-03-19
Attending: PHYSICIAN ASSISTANT
Payer: MEDICAID

## 2021-03-19 DIAGNOSIS — E11.21 TYPE 2 DIABETES MELLITUS WITH DIABETIC NEPHROPATHY, WITHOUT LONG-TERM CURRENT USE OF INSULIN (HCC): ICD-10-CM

## 2021-03-19 LAB
ALBUMIN SERPL-MCNC: 3.7 G/DL (ref 3.4–5)
ALBUMIN/GLOB SERPL: 0.8 {RATIO} (ref 1–2)
ALP LIVER SERPL-CCNC: 91 U/L
ALT SERPL-CCNC: 21 U/L
ANION GAP SERPL CALC-SCNC: 6 MMOL/L (ref 0–18)
AST SERPL-CCNC: 15 U/L (ref 15–37)
BILIRUB SERPL-MCNC: 1.3 MG/DL (ref 0.1–2)
BUN BLD-MCNC: 18 MG/DL (ref 7–18)
BUN/CREAT SERPL: 17.1 (ref 10–20)
CALCIUM BLD-MCNC: 9.7 MG/DL (ref 8.5–10.1)
CHLORIDE SERPL-SCNC: 104 MMOL/L (ref 98–112)
CHOLEST SMN-MCNC: 130 MG/DL (ref ?–200)
CO2 SERPL-SCNC: 25 MMOL/L (ref 21–32)
CREAT BLD-MCNC: 1.05 MG/DL
CREAT UR-SCNC: 545 MG/DL
EST. AVERAGE GLUCOSE BLD GHB EST-MCNC: 192 MG/DL (ref 68–126)
GLOBULIN PLAS-MCNC: 4.4 G/DL (ref 2.8–4.4)
GLUCOSE BLD-MCNC: 146 MG/DL (ref 70–99)
HBA1C MFR BLD HPLC: 8.3 % (ref ?–5.7)
HDLC SERPL-MCNC: 42 MG/DL (ref 40–59)
LDLC SERPL CALC-MCNC: 73 MG/DL (ref ?–100)
M PROTEIN MFR SERPL ELPH: 8.1 G/DL (ref 6.4–8.2)
MICROALBUMIN UR-MCNC: 185 MG/DL
MICROALBUMIN/CREAT 24H UR-RTO: 339.4 UG/MG (ref ?–30)
NONHDLC SERPL-MCNC: 88 MG/DL (ref ?–130)
OSMOLALITY SERPL CALC.SUM OF ELEC: 285 MOSM/KG (ref 275–295)
PATIENT FASTING Y/N/NP: YES
PATIENT FASTING Y/N/NP: YES
POTASSIUM SERPL-SCNC: 4.9 MMOL/L (ref 3.5–5.1)
SODIUM SERPL-SCNC: 135 MMOL/L (ref 136–145)
TRIGL SERPL-MCNC: 77 MG/DL (ref 30–149)
VLDLC SERPL CALC-MCNC: 15 MG/DL (ref 0–30)

## 2021-03-19 PROCEDURE — 82043 UR ALBUMIN QUANTITATIVE: CPT

## 2021-03-19 PROCEDURE — 82570 ASSAY OF URINE CREATININE: CPT

## 2021-03-19 PROCEDURE — 83036 HEMOGLOBIN GLYCOSYLATED A1C: CPT

## 2021-03-19 PROCEDURE — 80061 LIPID PANEL: CPT

## 2021-03-19 PROCEDURE — 36415 COLL VENOUS BLD VENIPUNCTURE: CPT

## 2021-03-19 PROCEDURE — 80053 COMPREHEN METABOLIC PANEL: CPT

## 2021-03-19 NOTE — PROGRESS NOTES
+ microalbuminuria, DM still not well controlled. LDL to goal.  Mild decrease in sodium. Pt has appt with me next week, will discuss with him then.

## 2021-03-24 ENCOUNTER — OFFICE VISIT (OUTPATIENT)
Dept: INTERNAL MEDICINE CLINIC | Facility: CLINIC | Age: 46
End: 2021-03-24
Payer: MEDICAID

## 2021-03-24 VITALS
SYSTOLIC BLOOD PRESSURE: 122 MMHG | OXYGEN SATURATION: 100 % | DIASTOLIC BLOOD PRESSURE: 82 MMHG | BODY MASS INDEX: 37.24 KG/M2 | HEIGHT: 73 IN | TEMPERATURE: 97 F | HEART RATE: 72 BPM | WEIGHT: 281 LBS

## 2021-03-24 DIAGNOSIS — E78.5 HYPERLIPIDEMIA LDL GOAL <100: ICD-10-CM

## 2021-03-24 DIAGNOSIS — E11.21 TYPE 2 DIABETES MELLITUS WITH DIABETIC NEPHROPATHY, WITHOUT LONG-TERM CURRENT USE OF INSULIN (HCC): ICD-10-CM

## 2021-03-24 DIAGNOSIS — L60.3 DYSTROPHIC NAIL: ICD-10-CM

## 2021-03-24 DIAGNOSIS — Z00.00 ANNUAL PHYSICAL EXAM: Primary | ICD-10-CM

## 2021-03-24 DIAGNOSIS — I10 ESSENTIAL HYPERTENSION: ICD-10-CM

## 2021-03-24 DIAGNOSIS — E11.42 DIABETIC POLYNEUROPATHY ASSOCIATED WITH TYPE 2 DIABETES MELLITUS (HCC): ICD-10-CM

## 2021-03-24 PROCEDURE — 3079F DIAST BP 80-89 MM HG: CPT | Performed by: PHYSICIAN ASSISTANT

## 2021-03-24 PROCEDURE — 3074F SYST BP LT 130 MM HG: CPT | Performed by: PHYSICIAN ASSISTANT

## 2021-03-24 PROCEDURE — 3008F BODY MASS INDEX DOCD: CPT | Performed by: PHYSICIAN ASSISTANT

## 2021-03-24 PROCEDURE — 99396 PREV VISIT EST AGE 40-64: CPT | Performed by: PHYSICIAN ASSISTANT

## 2021-03-24 NOTE — PROGRESS NOTES
Patient presents with:  Physical: LM rm 2  Health Maintenance: no eye exam as of yet. needs foot exam      HPI:  Pt presents for annual physical.     DM II - Admits he was not taking Victoza due to SEs, didn't like the way it made him feel.   Wasn't checkin Negative for adenopathy. Does not bruise/bleed easily. Psychiatric/Behavioral: See HPI.     Patient Active Problem List:     Essential hypertension     Type 2 diabetes mellitus with diabetic nephropathy, without long-term current use of insulin ([facility] TEST DAILY AS DIRECTED. 100 strip 2       Allergies  No Known Allergies    Health Maintenance    Immunization History  Administered            Date(s) Administered    Covid-19 Vaccine Pfizer 30 mcg/0.3 ml                          03/08/2021      FLULAVAL 6 03/19/2021 135* 136 - 145 mmol/L Final   • Potassium 03/19/2021 4.9  3.5 - 5.1 mmol/L Final   • Chloride 03/19/2021 104  98 - 112 mmol/L Final   • CO2 03/19/2021 25.0  21.0 - 32.0 mmol/L Final   • Anion Gap 03/19/2021 6  0 - 18 mmol/L Final   • BUN 03/19/2 controlled. Continue current meds. Type 2 diabetes mellitus with diabetic nephropathy, without long-term current use of insulin ([facility]) - Uncontrolled but pt only recently restarted Victoza, tolerating now.   Asked pt in the future to let me know immediatel

## 2021-04-06 ENCOUNTER — TELEPHONE (OUTPATIENT)
Dept: PHYSICAL THERAPY | Facility: HOSPITAL | Age: 46
End: 2021-04-06

## 2021-04-06 ENCOUNTER — APPOINTMENT (OUTPATIENT)
Dept: PHYSICAL THERAPY | Facility: HOSPITAL | Age: 46
End: 2021-04-06
Attending: ORTHOPAEDIC SURGERY
Payer: MEDICAID

## 2021-04-09 ENCOUNTER — APPOINTMENT (OUTPATIENT)
Dept: PHYSICAL THERAPY | Facility: HOSPITAL | Age: 46
End: 2021-04-09
Attending: ORTHOPAEDIC SURGERY
Payer: MEDICAID

## 2021-04-09 ENCOUNTER — TELEPHONE (OUTPATIENT)
Dept: PHYSICAL THERAPY | Facility: HOSPITAL | Age: 46
End: 2021-04-09

## 2021-04-13 ENCOUNTER — APPOINTMENT (OUTPATIENT)
Dept: PHYSICAL THERAPY | Facility: HOSPITAL | Age: 46
End: 2021-04-13
Payer: MEDICAID

## 2021-04-16 ENCOUNTER — TELEPHONE (OUTPATIENT)
Dept: PHYSICAL THERAPY | Facility: HOSPITAL | Age: 46
End: 2021-04-16

## 2021-04-16 ENCOUNTER — APPOINTMENT (OUTPATIENT)
Dept: PHYSICAL THERAPY | Facility: HOSPITAL | Age: 46
End: 2021-04-16
Attending: ORTHOPAEDIC SURGERY
Payer: MEDICAID

## 2021-04-20 ENCOUNTER — APPOINTMENT (OUTPATIENT)
Dept: PHYSICAL THERAPY | Facility: HOSPITAL | Age: 46
End: 2021-04-20
Payer: MEDICAID

## 2021-04-23 ENCOUNTER — APPOINTMENT (OUTPATIENT)
Dept: PHYSICAL THERAPY | Facility: HOSPITAL | Age: 46
End: 2021-04-23
Payer: MEDICAID

## 2021-04-27 ENCOUNTER — APPOINTMENT (OUTPATIENT)
Dept: PHYSICAL THERAPY | Facility: HOSPITAL | Age: 46
End: 2021-04-27
Payer: MEDICAID

## 2021-04-30 ENCOUNTER — APPOINTMENT (OUTPATIENT)
Dept: PHYSICAL THERAPY | Facility: HOSPITAL | Age: 46
End: 2021-04-30
Payer: MEDICAID

## 2021-05-23 DIAGNOSIS — I10 ESSENTIAL HYPERTENSION: ICD-10-CM

## 2021-05-25 RX ORDER — METOPROLOL SUCCINATE 100 MG/1
TABLET, EXTENDED RELEASE ORAL
Qty: 90 TABLET | Refills: 0 | Status: SHIPPED | OUTPATIENT
Start: 2021-05-25 | End: 2021-09-13

## 2021-05-25 RX ORDER — LANCETS 33 GAUGE
EACH MISCELLANEOUS
Qty: 100 EACH | Refills: 2 | Status: SHIPPED | OUTPATIENT
Start: 2021-05-25

## 2021-05-25 RX ORDER — LISINOPRIL AND HYDROCHLOROTHIAZIDE 20; 12.5 MG/1; MG/1
1 TABLET ORAL DAILY
Qty: 90 TABLET | Refills: 0 | Status: SHIPPED | OUTPATIENT
Start: 2021-05-25 | End: 2021-06-14

## 2021-06-10 DIAGNOSIS — E11.65 TYPE 2 DIABETES MELLITUS WITH HYPERGLYCEMIA, WITHOUT LONG-TERM CURRENT USE OF INSULIN (HCC): ICD-10-CM

## 2021-06-10 RX ORDER — PEN NEEDLE, DIABETIC 32GX 5/32"
NEEDLE, DISPOSABLE MISCELLANEOUS
Qty: 100 EACH | Refills: 1 | Status: SHIPPED | OUTPATIENT
Start: 2021-06-10 | End: 2021-11-11

## 2021-06-14 DIAGNOSIS — E11.21 TYPE 2 DIABETES MELLITUS WITH DIABETIC NEPHROPATHY, WITHOUT LONG-TERM CURRENT USE OF INSULIN (HCC): ICD-10-CM

## 2021-06-14 DIAGNOSIS — I10 ESSENTIAL HYPERTENSION: ICD-10-CM

## 2021-06-14 RX ORDER — LISINOPRIL AND HYDROCHLOROTHIAZIDE 20; 12.5 MG/1; MG/1
1 TABLET ORAL DAILY
Qty: 90 TABLET | Refills: 1 | Status: SHIPPED | OUTPATIENT
Start: 2021-06-14 | End: 2022-01-11

## 2021-06-14 NOTE — TELEPHONE ENCOUNTER
Last Ov: 3/24/21, CB, CPE  Last labs: CMP, Lipid, A1c, Microalb/creat 3/19/21  Last Rx:   Metformin 500mg, #180, 0R 3/17/21  Victoza 18mg/3mL, 27mL, 0R 3/17/21    No future appointments.     Per Protocol - both fail, A1c out of range

## 2021-06-15 RX ORDER — LIRAGLUTIDE 6 MG/ML
INJECTION SUBCUTANEOUS
Qty: 27 ML | Refills: 0 | Status: SHIPPED | OUTPATIENT
Start: 2021-06-15 | End: 2021-09-13

## 2021-07-14 RX ORDER — ATORVASTATIN CALCIUM 20 MG/1
TABLET, FILM COATED ORAL
Qty: 90 TABLET | Refills: 0 | Status: SHIPPED | OUTPATIENT
Start: 2021-07-14 | End: 2021-10-14

## 2021-09-12 DIAGNOSIS — I10 ESSENTIAL HYPERTENSION: ICD-10-CM

## 2021-09-12 DIAGNOSIS — E11.21 TYPE 2 DIABETES MELLITUS WITH DIABETIC NEPHROPATHY, WITHOUT LONG-TERM CURRENT USE OF INSULIN (HCC): ICD-10-CM

## 2021-09-12 RX ORDER — LIRAGLUTIDE 6 MG/ML
INJECTION SUBCUTANEOUS
Qty: 27 ML | Refills: 0 | Status: CANCELLED | OUTPATIENT
Start: 2021-09-12

## 2021-09-13 DIAGNOSIS — E11.21 TYPE 2 DIABETES MELLITUS WITH DIABETIC NEPHROPATHY, WITHOUT LONG-TERM CURRENT USE OF INSULIN (HCC): ICD-10-CM

## 2021-09-13 RX ORDER — METOPROLOL SUCCINATE 100 MG/1
TABLET, EXTENDED RELEASE ORAL
Qty: 90 TABLET | Refills: 0 | Status: SHIPPED | OUTPATIENT
Start: 2021-09-13 | End: 2021-12-14

## 2021-09-13 RX ORDER — LIRAGLUTIDE 6 MG/ML
INJECTION SUBCUTANEOUS
Qty: 9 ML | Refills: 0 | Status: SHIPPED | OUTPATIENT
Start: 2021-09-13 | End: 2021-10-14

## 2021-09-13 RX ORDER — LIRAGLUTIDE 6 MG/ML
INJECTION SUBCUTANEOUS
Qty: 27 ML | Refills: 0 | OUTPATIENT
Start: 2021-09-13

## 2021-09-13 NOTE — TELEPHONE ENCOUNTER
Last VISIT 03/24/21    Last CPE 03/24/21    Last REFILL 06/15/21   METFORMIN  MG Oral Tab 180 tablet 0     VICTOZA 18 MG/3ML Subcutaneous Solution Pen-injector 27 mL 0     Last LABS 03/19/21 CMP, Lipid, A1c, Microalb done     No future appointments.

## 2021-10-12 ENCOUNTER — TELEPHONE (OUTPATIENT)
Dept: INTERNAL MEDICINE CLINIC | Facility: CLINIC | Age: 46
End: 2021-10-12

## 2021-10-12 DIAGNOSIS — E11.65 TYPE 2 DIABETES MELLITUS WITH HYPERGLYCEMIA, WITHOUT LONG-TERM CURRENT USE OF INSULIN (HCC): Primary | ICD-10-CM

## 2021-10-12 DIAGNOSIS — E11.21 TYPE 2 DIABETES MELLITUS WITH DIABETIC NEPHROPATHY, WITHOUT LONG-TERM CURRENT USE OF INSULIN (HCC): ICD-10-CM

## 2021-10-13 DIAGNOSIS — E11.21 TYPE 2 DIABETES MELLITUS WITH DIABETIC NEPHROPATHY, WITHOUT LONG-TERM CURRENT USE OF INSULIN (HCC): ICD-10-CM

## 2021-10-14 RX ORDER — LIRAGLUTIDE 6 MG/ML
INJECTION SUBCUTANEOUS
Qty: 9 ML | Refills: 0 | Status: SHIPPED | OUTPATIENT
Start: 2021-10-14

## 2021-10-14 RX ORDER — ATORVASTATIN CALCIUM 20 MG/1
TABLET, FILM COATED ORAL
Qty: 90 TABLET | Refills: 0 | Status: SHIPPED | OUTPATIENT
Start: 2021-10-14 | End: 2022-01-11

## 2021-10-14 RX ORDER — LIRAGLUTIDE 6 MG/ML
INJECTION SUBCUTANEOUS
Qty: 9 ML | Refills: 0 | OUTPATIENT
Start: 2021-10-14

## 2021-10-14 NOTE — TELEPHONE ENCOUNTER
Refills sent but overdue for labs and f/u with many GAPs to address. Please call pt and schedule f/u. I ordered lab for pt to do prior to OV.

## 2021-11-11 DIAGNOSIS — E11.65 TYPE 2 DIABETES MELLITUS WITH HYPERGLYCEMIA, WITHOUT LONG-TERM CURRENT USE OF INSULIN (HCC): ICD-10-CM

## 2021-11-11 DIAGNOSIS — E11.21 TYPE 2 DIABETES MELLITUS WITH DIABETIC NEPHROPATHY, WITHOUT LONG-TERM CURRENT USE OF INSULIN (HCC): ICD-10-CM

## 2021-11-11 RX ORDER — LIRAGLUTIDE 6 MG/ML
INJECTION SUBCUTANEOUS
Qty: 9 ML | Refills: 0 | OUTPATIENT
Start: 2021-11-11

## 2021-11-11 RX ORDER — PEN NEEDLE, DIABETIC 32GX 5/32"
1 NEEDLE, DISPOSABLE MISCELLANEOUS DAILY
Qty: 100 EACH | Refills: 0 | Status: SHIPPED | OUTPATIENT
Start: 2021-11-11

## 2021-11-11 NOTE — TELEPHONE ENCOUNTER
Last VISIT 3/24/21 CB CPE    Last CPE 3/24/21 CB CPE    Last REFILL 10/14/21 Victoza 9mL 0R    Last LABS 3/19/21 MA/Creat, HgA1c, Lipid, CMP    No future appointments. Per PROTOCOL?   Diabetic protocol failed, Route to provider    Please Approve or Humaira Cortez

## 2021-11-12 NOTE — TELEPHONE ENCOUNTER
I spoke with pt personally. States he has been traveling a lot for work, currently out of town as grandfather passed away. Agreed to call to scheduled f/u appt. States he does not need Victoza refill now, has plenty.   Asked for refill of needles and beatris

## 2021-11-26 NOTE — TELEPHONE ENCOUNTER
Pt due for OV around 6/24/21, not scheduled yet. Please remind him to schedule. OK to schedule in July. no fever/no chills/no sweating/no anorexia

## 2021-12-11 DIAGNOSIS — I10 ESSENTIAL HYPERTENSION: ICD-10-CM

## 2021-12-14 RX ORDER — METOPROLOL SUCCINATE 100 MG/1
TABLET, EXTENDED RELEASE ORAL
Qty: 30 TABLET | Refills: 0 | Status: SHIPPED | OUTPATIENT
Start: 2021-12-14 | End: 2022-01-11

## 2021-12-14 NOTE — TELEPHONE ENCOUNTER
Last VISIT 03/24/21    Last CPE 03/24/21    Last REFILL 09/13/21  METFORMIN 500 MG Oral Tab 180 tablet 0     METOPROLOL SUCCINATE 100 MG Oral Tablet 24 Hr 90 tablet 0       Last LABS 03/19/21CMP, Lipid, A1c, Microalb done    No future appointments.       Pe

## 2021-12-14 NOTE — TELEPHONE ENCOUNTER
I sent a 1 month supply. I spoke with pt personally on 11/11/21 as he is overdue for labs and OV. Was out of town. Please call him again to remind him of need to do labs, non-fasting already ordered and schedule OV.   Please remind him to schedule DM eye

## 2021-12-16 NOTE — TELEPHONE ENCOUNTER
[facility] sent to patient to inform labs placed, and ov needs to be scheduled.

## 2022-01-10 DIAGNOSIS — I10 ESSENTIAL HYPERTENSION: ICD-10-CM

## 2022-01-11 RX ORDER — LISINOPRIL AND HYDROCHLOROTHIAZIDE 20; 12.5 MG/1; MG/1
1 TABLET ORAL DAILY
Qty: 90 TABLET | Refills: 1 | Status: SHIPPED | OUTPATIENT
Start: 2022-01-11

## 2022-01-11 RX ORDER — ATORVASTATIN CALCIUM 20 MG/1
TABLET, FILM COATED ORAL
Qty: 90 TABLET | Refills: 0 | Status: SHIPPED | OUTPATIENT
Start: 2022-01-11

## 2022-01-11 RX ORDER — METOPROLOL SUCCINATE 100 MG/1
TABLET, EXTENDED RELEASE ORAL
Qty: 30 TABLET | Refills: 0 | Status: SHIPPED | OUTPATIENT
Start: 2022-01-11

## 2022-01-11 NOTE — TELEPHONE ENCOUNTER
Requested Prescriptions     Pending Prescriptions Disp Refills   • METFORMIN 500 MG Oral Tab [Pharmacy Med Name: METFORMIN 500MG TABLETS] 60 tablet 0     Sig: TAKE 1 TABLET(500 MG) BY MOUTH TWICE DAILY WITH MEALS   • LISINOPRIL-HYDROCHLOROTHIAZIDE 20-12.5

## 2022-01-11 NOTE — TELEPHONE ENCOUNTER
Medication(s) to Refill:   Requested Prescriptions     Pending Prescriptions Disp Refills   • ATORVASTATIN 20 MG Oral Tab [Pharmacy Med Name: ATORVASTATIN 20MG TABLETS] 90 tablet 0     Sig: TAKE 1 TABLET(20 MG) BY MOUTH EVERY NIGHT   • METOPROLOL SUCCINATE

## 2022-02-14 RX ORDER — METOPROLOL SUCCINATE 100 MG/1
TABLET, EXTENDED RELEASE ORAL
Qty: 30 TABLET | Refills: 0 | Status: SHIPPED | OUTPATIENT
Start: 2022-02-14 | End: 2022-03-15

## 2022-02-17 ENCOUNTER — TELEPHONE (OUTPATIENT)
Dept: INTERNAL MEDICINE CLINIC | Facility: CLINIC | Age: 47
End: 2022-02-17

## 2022-02-18 RX ORDER — LANCETS 33 GAUGE
EACH MISCELLANEOUS
Qty: 100 EACH | Refills: 0 | Status: SHIPPED | OUTPATIENT
Start: 2022-02-18 | End: 2022-05-14

## 2022-03-15 DIAGNOSIS — I10 ESSENTIAL HYPERTENSION: ICD-10-CM

## 2022-03-15 DIAGNOSIS — E11.21 TYPE 2 DIABETES MELLITUS WITH DIABETIC NEPHROPATHY, WITHOUT LONG-TERM CURRENT USE OF INSULIN (HCC): ICD-10-CM

## 2022-03-15 DIAGNOSIS — E78.5 HYPERLIPIDEMIA LDL GOAL <100: Primary | ICD-10-CM

## 2022-03-15 DIAGNOSIS — Z13.0 SCREENING FOR BLOOD DISEASE: ICD-10-CM

## 2022-03-15 DIAGNOSIS — Z13.29 THYROID DISORDER SCREEN: ICD-10-CM

## 2022-03-15 RX ORDER — METOPROLOL SUCCINATE 100 MG/1
TABLET, EXTENDED RELEASE ORAL
Qty: 30 TABLET | Refills: 0 | Status: SHIPPED | OUTPATIENT
Start: 2022-03-15 | End: 2022-05-19

## 2022-03-15 NOTE — TELEPHONE ENCOUNTER
The last [employer] message we sent on 2/23/22 was not read by pt. Please continue to call pt. He is well overdue for f/u and CPE  I sent 30 day supply of meds but he needs to schedule an OV. I ordered labs to [facility] for pt to do prior to his visit (fasting labs).

## 2022-03-19 ENCOUNTER — TELEPHONE (OUTPATIENT)
Dept: INTERNAL MEDICINE CLINIC | Facility: CLINIC | Age: 47
End: 2022-03-19

## 2022-03-19 NOTE — TELEPHONE ENCOUNTER
covid positive as of yesterday. Pt has sinus congestion, sore throat, no fever, chest pain or sob. He has htn, dm2, obesity. Is a candidate for paxlovid. Sent to pharmacy. Advised to hold statin and victoza while on it.    Call with tara mendieta or go to ER/WIC

## 2022-03-21 NOTE — TELEPHONE ENCOUNTER
Future Appointments   Date Time Provider Guille Ellen   3/22/2022  2:20 PM Zhao Martins MD [facility]

## 2022-03-22 ENCOUNTER — TELEMEDICINE (OUTPATIENT)
Dept: INTERNAL MEDICINE CLINIC | Facility: CLINIC | Age: 47
End: 2022-03-22
Payer: MEDICAID

## 2022-03-22 DIAGNOSIS — J02.9 SORE THROAT: ICD-10-CM

## 2022-03-22 DIAGNOSIS — U07.1 COVID-19 VIRUS INFECTION: Primary | ICD-10-CM

## 2022-03-22 DIAGNOSIS — Z12.11 SCREEN FOR COLON CANCER: ICD-10-CM

## 2022-03-22 DIAGNOSIS — E11.21 TYPE 2 DIABETES MELLITUS WITH DIABETIC NEPHROPATHY, WITHOUT LONG-TERM CURRENT USE OF INSULIN (HCC): ICD-10-CM

## 2022-03-22 PROCEDURE — 99214 OFFICE O/P EST MOD 30 MIN: CPT | Performed by: INTERNAL MEDICINE

## 2022-03-22 RX ORDER — ALBUTEROL SULFATE 90 UG/1
2 AEROSOL, METERED RESPIRATORY (INHALATION) EVERY 4 HOURS PRN
Qty: 1 EACH | Refills: 1 | Status: SHIPPED | OUTPATIENT
Start: 2022-03-22

## 2022-03-22 NOTE — PROGRESS NOTES
Due to COVID-19 ACTION PLAN, the patient's office visit was converted to a phone visit with informed patient consent. Time Spent: 12 min    Subjective     HPI:   Lauren Miller is a 40year old male who presents for covid 19 infection with symptoms that started 3/13/22. He had sinus congestion, cough and severe ST. He went to urgent care and was treated with augmentin and flonase for presumed sinusitis (he is still taking both). His covid 19 test came back positive 3/18 so he called and talked to Dr. Nav Dela Cruz who prescribed Paxlovid. Patient read the interactions and side effects and never took the medication. He is feeling a lot better, no ST/congestion/f/c. Only thing still bothering him is the sporadic cough. He is overdue for DM labs and f/u, discussed checking labs and making an extended f/u. He is also due for colon cancer screen, advised on FOBT which I will mail to him today. No Further Nursing Notes to Review            REVIEW OF SYSTEMS:  No f/c/chest pain or sob. +cough. No n/v/d. No other complaints today. Physical Exam:  Pleasant, speaking in full sentences  No labored breathing  Mood is normal      Assessment    Diagnoses and all orders for this visit:    COVID-19 virus infection- doing well, never took Paxlovid due to potential side effects that he read about. He is taking course of augmentin from urgent care for possible sinusitis. Only symptom is sporadic cough for which he can use albuterol prn.      Sore throat- resolved      Type 2 diabetes mellitus with diabetic nephropathy, without long-term current use of insulin ([facility]- due for all DM labs, pt aware this should be done asap and then he should follow up in clinic    Screen for colon cancer- check FOBT         Return in about 4 weeks (around 4/19/2022), or if symptoms worsen or fail to improve, for chronic issues, please check labs first.    MD Malik Bedolla III understands phone evaluation is not a substitute for face-to-face examination or emergency care. Patient advised to go to ER or call 911 for worsening symptoms or acute distress. Please note that the following visit was completed using two-way, real-time interactive audio communication. This has been done in good martha to provide continuity of care in the best interest of the provider-patient relationship, due to the on-going public health crisis/national emergency and because of restrictions of visitation. There are limitations of this visit as no physical exam could be performed. Every conscious effort was taken to allow for sufficient and adequate time. This billing visit was spent on reviewing labs, medications, radiology tests and decision making. Appropriate medical decision-making and tests are ordered as detailed in the plan of care above.

## 2022-03-31 ENCOUNTER — TELEMEDICINE (OUTPATIENT)
Dept: INTERNAL MEDICINE CLINIC | Facility: CLINIC | Age: 47
End: 2022-03-31
Payer: MEDICAID

## 2022-03-31 ENCOUNTER — TELEPHONE (OUTPATIENT)
Dept: INTERNAL MEDICINE CLINIC | Facility: CLINIC | Age: 47
End: 2022-03-31

## 2022-03-31 ENCOUNTER — WALK IN (OUTPATIENT)
Dept: URGENT CARE | Age: 47
End: 2022-03-31

## 2022-03-31 DIAGNOSIS — J01.00 ACUTE NON-RECURRENT MAXILLARY SINUSITIS: Primary | ICD-10-CM

## 2022-03-31 DIAGNOSIS — Z53.21 PATIENT LEFT WITHOUT BEING SEEN: Primary | ICD-10-CM

## 2022-03-31 PROCEDURE — 99213 OFFICE O/P EST LOW 20 MIN: CPT | Performed by: PHYSICIAN ASSISTANT

## 2022-03-31 RX ORDER — CEFUROXIME AXETIL 500 MG/1
500 TABLET ORAL 2 TIMES DAILY
Qty: 14 TABLET | Refills: 0 | Status: SHIPPED | OUTPATIENT
Start: 2022-03-31 | End: 2022-04-07

## 2022-03-31 NOTE — TELEPHONE ENCOUNTER
Spoke to pt. Pt said that is eye sxs started yesterday in his L eye only. Eye now starting to turn pink. Pts eye is also itchy/has light sensitivity. Offered appt to pt. Pt said that he is currently in [facility]. Informed pt that VV not possible because he is out of state. Advised to be seen in an urgent care/WIC near him given he is out of state. Routing to  for fyi.

## 2022-03-31 NOTE — TELEPHONE ENCOUNTER
Pt had VV with TB 3/22/22 for covid. Per TB note, pt sx started on 3/13/22. LOV prior to VV with CB 3/24/21 for cpe. CB, ok for in office visit?

## 2022-03-31 NOTE — PROGRESS NOTES
Due to COVID-19 ACTION PLAN, the patient's office visit was converted to a video visit. Helena Verma III verbally consents to a Virtual/Telephone visit on 03/31/22. Patient understands and accepts financial responsibility for any deductible, co-insurance and/or co-pays associated with this service. Time Spent:  4342-9037 (14 min)    Subjective     HPI:   Royal Britt is a 56year old male who presents with c/o L eye irritation since yesterday evening. Eye has been watery and foggy at times, like a film is collecting in his eye, he is able to blink or rub it away. Has remained irritated today, some mild redness to the conjunctiva medially. He denies crusting this AM on awakening. Pt did have COVID about 10 days ago. He has been recovering well, only mild sxs. Sinus congestion persists and he does have pressure in the L sinus. Has noticed some L eye sxs when blowing his nose. Cough is improving but persists. Pt did see TB for COVID appt (VV) on 3/22/22. Pt denies any new or worsening sxs. He denies CP and SOB. Pt was given Augmentin in UC and finished a 10 day course of it, finished about 3 days ago. No Further Nursing Notes to Review            REVIEW OF SYSTEMS:  Pertinent items are noted in HPI. Physical Exam:  alert, appears stated age, cooperative and no distress, Speaking in full sentences comfortably and Normal work of breathing        Assessment   Diagnoses and all orders for this visit:    Acute non-recurrent maxillary sinusitis - Suspect eye sxs are related to sinuses. Less likely conjunctivitis. Take Ceftin X 7 days. Irrigate sinuses. F/U in office next week if sxs not resolving/improving. Pt was given ER warnings for vision changes, pain in eye etc.      Other orders  -     cefuroxime 500 MG Oral Tab; Take 1 tablet (500 mg total) by mouth 2 (two) times daily for 7 days. Prescription medication ordered. No follow-ups on file.     Jorge Jameson, PA-C Vonne Brunner III understands phone evaluation is not a substitute for face-to-face examination or emergency care. Patient advised to go to ER or call 911 for worsening symptoms or acute distress. Please note that the following visit was completed using two-way, real-time interactive audio and/or video communication. This has been done in good martha to provide continuity of care in the best interest of the provider-patient relationship, due to the on-going public health crisis/national emergency and because of restrictions of visitation. There are limitations of this visit as no or only very limited physical exam could be performed. Every conscious effort was taken to allow for sufficient and adequate time. This billing visit was spent on reviewing labs, medications, radiology tests and decision making. Appropriate medical decision-making and tests are ordered as detailed in the plan of care above.

## 2022-03-31 NOTE — TELEPHONE ENCOUNTER
Patient had Covid 14 days ago. Patient had a VV with TB and now has an eye infection; irritated, itchy, blurry vision from the draining, no redness.

## 2022-03-31 NOTE — TELEPHONE ENCOUNTER
Pt called back and stated that he is driving to his friends house in [place], [place] and wants to do a VV with CB     Scheduled pt   Future Appointments   Date Time Provider Guille Hughes   3/31/2022  4:40 PM Don Arechiga PA-C [facility]

## 2022-04-04 ENCOUNTER — TELEPHONE (OUTPATIENT)
Dept: ADMINISTRATIVE | Age: 47
End: 2022-04-04

## 2022-04-04 NOTE — TELEPHONE ENCOUNTER
Patient requested a new referral to see Ophthalmology Daily,please review and sign plan and care if you agree Thank you. Bethany Ya V      [facility] REFERRALS.

## 2022-04-04 NOTE — TELEPHONE ENCOUNTER
Referral placed by [facility]. Faxed to Dr. Guzman [place] office.

## 2022-04-04 NOTE — TELEPHONE ENCOUNTER
Sending to [address], on call. Referral pended. States he called the Opthalmology Triage RN and she informed pt he may have a diabetic bleed in eye. Scheduled today with Dr. Liliana Campbell. Referral pended. Will ask referral dept to [facility] Marte now if agreeable. Ordered pended.      Fax [phone]

## 2022-04-04 NOTE — TELEPHONE ENCOUNTER
Specialty:   opthamology    Full Name of Specialist:  Dr. Velásquez Factor    Name of the Provider Group:  Address:  Phone number:  Fax number :    Date of Appointment:  TODAY AT 1:30pm    Reason for the Appointment (be specific with diagnosis code): poss diabetes bleed in L eye    Has the patient seen a provider in our office for stated problem?:yes CB VV the other day    Is this request for an out of network referral? no  (if yes, please have patient contact referring provider and have them fax office visit notes to triage attention):

## 2022-04-06 ENCOUNTER — LAB ENCOUNTER (OUTPATIENT)
Dept: LAB | Age: 47
End: 2022-04-06
Attending: PHYSICIAN ASSISTANT
Payer: MEDICAID

## 2022-04-06 DIAGNOSIS — E11.21 DIABETIC GLOMERULOPATHY (HCC): ICD-10-CM

## 2022-04-06 DIAGNOSIS — E78.5 HYPERLIPEMIA: Primary | ICD-10-CM

## 2022-04-06 LAB
BASOPHILS # BLD AUTO: 0.04 X10(3) UL (ref 0–0.2)
BASOPHILS NFR BLD AUTO: 0.7 %
CREAT UR-SCNC: 41.6 MG/DL
EOSINOPHIL # BLD AUTO: 0.12 X10(3) UL (ref 0–0.7)
EOSINOPHIL NFR BLD AUTO: 2.1 %
ERYTHROCYTE [DISTWIDTH] IN BLOOD BY AUTOMATED COUNT: 12.6 %
EST. AVERAGE GLUCOSE BLD GHB EST-MCNC: 203 MG/DL (ref 68–126)
HBA1C MFR BLD: 8.7 % (ref ?–5.7)
HCT VFR BLD AUTO: 43.4 %
HGB BLD-MCNC: 14.5 G/DL
IMM GRANULOCYTES # BLD AUTO: 0.01 X10(3) UL (ref 0–1)
IMM GRANULOCYTES NFR BLD: 0.2 %
LYMPHOCYTES # BLD AUTO: 2.19 X10(3) UL (ref 1–4)
LYMPHOCYTES NFR BLD AUTO: 38.8 %
MCH RBC QN AUTO: 28.8 PG (ref 26–34)
MCHC RBC AUTO-ENTMCNC: 33.4 G/DL (ref 31–37)
MCV RBC AUTO: 86.1 FL
MICROALBUMIN UR-MCNC: 57.2 MG/DL
MICROALBUMIN/CREAT 24H UR-RTO: 1375 UG/MG (ref ?–30)
MONOCYTES # BLD AUTO: 0.54 X10(3) UL (ref 0.1–1)
MONOCYTES NFR BLD AUTO: 9.6 %
NEUTROPHILS # BLD AUTO: 2.74 X10 (3) UL (ref 1.5–7.7)
NEUTROPHILS # BLD AUTO: 2.74 X10(3) UL (ref 1.5–7.7)
NEUTROPHILS NFR BLD AUTO: 48.6 %
PLATELET # BLD AUTO: 205 10(3)UL (ref 150–450)
RBC # BLD AUTO: 5.04 X10(6)UL
WBC # BLD AUTO: 5.6 X10(3) UL (ref 4–11)

## 2022-04-06 PROCEDURE — 3052F HG A1C>EQUAL 8.0%<EQUAL 9.0%: CPT | Performed by: PHYSICIAN ASSISTANT

## 2022-04-06 PROCEDURE — 80053 COMPREHEN METABOLIC PANEL: CPT | Performed by: PHYSICIAN ASSISTANT

## 2022-04-06 PROCEDURE — 83036 HEMOGLOBIN GLYCOSYLATED A1C: CPT | Performed by: PHYSICIAN ASSISTANT

## 2022-04-06 PROCEDURE — 80061 LIPID PANEL: CPT

## 2022-04-06 PROCEDURE — 85025 COMPLETE CBC W/AUTO DIFF WBC: CPT | Performed by: PHYSICIAN ASSISTANT

## 2022-04-06 PROCEDURE — 82570 ASSAY OF URINE CREATININE: CPT | Performed by: PHYSICIAN ASSISTANT

## 2022-04-06 PROCEDURE — 84443 ASSAY THYROID STIM HORMONE: CPT | Performed by: PHYSICIAN ASSISTANT

## 2022-04-06 PROCEDURE — 82043 UR ALBUMIN QUANTITATIVE: CPT | Performed by: PHYSICIAN ASSISTANT

## 2022-04-06 PROCEDURE — 3060F POS MICROALBUMINURIA REV: CPT | Performed by: PHYSICIAN ASSISTANT

## 2022-04-06 PROCEDURE — 36415 COLL VENOUS BLD VENIPUNCTURE: CPT

## 2022-04-07 ENCOUNTER — OFFICE VISIT (OUTPATIENT)
Dept: INTERNAL MEDICINE CLINIC | Facility: CLINIC | Age: 47
End: 2022-04-07
Payer: MEDICAID

## 2022-04-07 VITALS
HEART RATE: 76 BPM | SYSTOLIC BLOOD PRESSURE: 158 MMHG | WEIGHT: 286.19 LBS | OXYGEN SATURATION: 97 % | TEMPERATURE: 98 F | RESPIRATION RATE: 16 BRPM | HEIGHT: 72 IN | DIASTOLIC BLOOD PRESSURE: 102 MMHG | BODY MASS INDEX: 38.76 KG/M2

## 2022-04-07 DIAGNOSIS — Z23 NEED FOR TDAP VACCINATION: ICD-10-CM

## 2022-04-07 DIAGNOSIS — I10 ESSENTIAL HYPERTENSION: ICD-10-CM

## 2022-04-07 DIAGNOSIS — E78.5 HYPERLIPIDEMIA LDL GOAL <100: ICD-10-CM

## 2022-04-07 DIAGNOSIS — R80.9 DIABETES MELLITUS DUE TO UNDERLYING CONDITION WITH MICROALBUMINURIA, WITHOUT LONG-TERM CURRENT USE OF INSULIN (HCC): ICD-10-CM

## 2022-04-07 DIAGNOSIS — Z12.11 ENCOUNTER FOR SCREENING FECAL OCCULT BLOOD TESTING: ICD-10-CM

## 2022-04-07 DIAGNOSIS — E11.65 UNCONTROLLED TYPE 2 DIABETES MELLITUS WITH HYPERGLYCEMIA (HCC): Primary | ICD-10-CM

## 2022-04-07 DIAGNOSIS — E08.29 DIABETES MELLITUS DUE TO UNDERLYING CONDITION WITH MICROALBUMINURIA, WITHOUT LONG-TERM CURRENT USE OF INSULIN (HCC): ICD-10-CM

## 2022-04-07 DIAGNOSIS — E11.3593 TYPE 2 DIABETES MELLITUS WITH PROLIFERATIVE RETINOPATHY OF BOTH EYES, WITHOUT LONG-TERM CURRENT USE OF INSULIN, MACULAR EDEMA PRESENCE UNSPECIFIED, UNSPECIFIED PROLIFERATIVE RETINOPATHY TYPE (HCC): ICD-10-CM

## 2022-04-07 PROCEDURE — 90471 IMMUNIZATION ADMIN: CPT | Performed by: PHYSICIAN ASSISTANT

## 2022-04-07 PROCEDURE — 90715 TDAP VACCINE 7 YRS/> IM: CPT | Performed by: PHYSICIAN ASSISTANT

## 2022-04-07 PROCEDURE — 3077F SYST BP >= 140 MM HG: CPT | Performed by: PHYSICIAN ASSISTANT

## 2022-04-07 PROCEDURE — 3008F BODY MASS INDEX DOCD: CPT | Performed by: PHYSICIAN ASSISTANT

## 2022-04-07 PROCEDURE — 3080F DIAST BP >= 90 MM HG: CPT | Performed by: PHYSICIAN ASSISTANT

## 2022-04-07 PROCEDURE — 99215 OFFICE O/P EST HI 40 MIN: CPT | Performed by: PHYSICIAN ASSISTANT

## 2022-04-07 RX ORDER — FLUTICASONE PROPIONATE 50 MCG
1 SPRAY, SUSPENSION (ML) NASAL DAILY
COMMUNITY
Start: 2022-03-18

## 2022-04-08 ENCOUNTER — OFFICE VISIT (OUTPATIENT)
Dept: ENDOCRINOLOGY CLINIC | Facility: CLINIC | Age: 47
End: 2022-04-08
Payer: MEDICAID

## 2022-04-08 ENCOUNTER — LAB ENCOUNTER (OUTPATIENT)
Dept: LAB | Age: 47
End: 2022-04-08
Attending: PHYSICIAN ASSISTANT
Payer: MEDICAID

## 2022-04-08 VITALS
WEIGHT: 285.63 LBS | DIASTOLIC BLOOD PRESSURE: 107 MMHG | OXYGEN SATURATION: 99 % | HEART RATE: 61 BPM | BODY MASS INDEX: 39 KG/M2 | SYSTOLIC BLOOD PRESSURE: 196 MMHG

## 2022-04-08 DIAGNOSIS — E11.21 TYPE 2 DIABETES MELLITUS WITH DIABETIC NEPHROPATHY, WITHOUT LONG-TERM CURRENT USE OF INSULIN (HCC): Primary | ICD-10-CM

## 2022-04-08 DIAGNOSIS — Z13.29 SCREENING FOR THYROID DISORDER: ICD-10-CM

## 2022-04-08 DIAGNOSIS — I10 ESSENTIAL HYPERTENSION: ICD-10-CM

## 2022-04-08 PROBLEM — E11.3599 PROLIFERATIVE DIABETIC RETINOPATHY ASSOCIATED WITH TYPE 2 DIABETES MELLITUS (HCC): Status: ACTIVE | Noted: 2022-04-08

## 2022-04-08 LAB
ALBUMIN SERPL-MCNC: 3.8 G/DL (ref 3.4–5)
ALBUMIN/GLOB SERPL: 0.9 {RATIO} (ref 1–2)
ALP LIVER SERPL-CCNC: 92 U/L
ALT SERPL-CCNC: 32 U/L
ANION GAP SERPL CALC-SCNC: 6 MMOL/L (ref 0–18)
AST SERPL-CCNC: 30 U/L (ref 15–37)
BILIRUB SERPL-MCNC: 1 MG/DL (ref 0.1–2)
BUN BLD-MCNC: 18 MG/DL (ref 7–18)
CALCIUM BLD-MCNC: 9 MG/DL (ref 8.5–10.1)
CHLORIDE SERPL-SCNC: 101 MMOL/L (ref 98–112)
CHOLEST SERPL-MCNC: 138 MG/DL (ref ?–200)
CO2 SERPL-SCNC: 27 MMOL/L (ref 21–32)
CREAT BLD-MCNC: 1.01 MG/DL
FASTING PATIENT LIPID ANSWER: YES
FASTING STATUS PATIENT QL REPORTED: YES
GLOBULIN PLAS-MCNC: 4.3 G/DL (ref 2.8–4.4)
GLUCOSE BLD-MCNC: 95 MG/DL (ref 70–99)
HDLC SERPL-MCNC: 38 MG/DL (ref 40–59)
LDLC SERPL CALC-MCNC: 79 MG/DL (ref ?–100)
NONHDLC SERPL-MCNC: 100 MG/DL (ref ?–130)
OSMOLALITY SERPL CALC.SUM OF ELEC: 280 MOSM/KG (ref 275–295)
POTASSIUM SERPL-SCNC: 4.4 MMOL/L (ref 3.5–5.1)
PROT SERPL-MCNC: 8.1 G/DL (ref 6.4–8.2)
SODIUM SERPL-SCNC: 134 MMOL/L (ref 136–145)
TRIGL SERPL-MCNC: 113 MG/DL (ref 30–149)
TSI SER-ACNC: 1.6 MIU/ML (ref 0.36–3.74)
VLDLC SERPL CALC-MCNC: 18 MG/DL (ref 0–30)

## 2022-04-08 PROCEDURE — 3080F DIAST BP >= 90 MM HG: CPT | Performed by: NURSE PRACTITIONER

## 2022-04-08 PROCEDURE — 84443 ASSAY THYROID STIM HORMONE: CPT

## 2022-04-08 PROCEDURE — 36415 COLL VENOUS BLD VENIPUNCTURE: CPT

## 2022-04-08 PROCEDURE — 80053 COMPREHEN METABOLIC PANEL: CPT

## 2022-04-08 PROCEDURE — 99215 OFFICE O/P EST HI 40 MIN: CPT | Performed by: NURSE PRACTITIONER

## 2022-04-08 PROCEDURE — 80061 LIPID PANEL: CPT

## 2022-04-08 PROCEDURE — 3077F SYST BP >= 140 MM HG: CPT | Performed by: NURSE PRACTITIONER

## 2022-04-08 RX ORDER — BLOOD SUGAR DIAGNOSTIC
STRIP MISCELLANEOUS
Qty: 300 STRIP | Refills: 1 | Status: SHIPPED | OUTPATIENT
Start: 2022-04-08

## 2022-04-09 NOTE — PROGRESS NOTES
CMP shows mild hyponatremia. For this reason hydrochlorothiazide cannot be increased but need to increase Lisinopril for elevated BP. Will need to split the combo. Send new Rxs for Lisinopril 40 mg daily #30 with 1 refill and hydrochlorothiazide 12.5 mg PO daily #30 with 1 refill once you talk with pt and he is agreeable. Continue Metoprolol. HDL low. Increase exercise. LDL OK. Continue Atorvastatin. Repeat BMP in 2 weeks. F/U with me in 4-6 weeks for BP check.

## 2022-04-12 ENCOUNTER — TELEPHONE (OUTPATIENT)
Dept: INTERNAL MEDICINE CLINIC | Facility: CLINIC | Age: 47
End: 2022-04-12

## 2022-04-13 ENCOUNTER — PATIENT MESSAGE (OUTPATIENT)
Dept: ENDOCRINOLOGY CLINIC | Facility: CLINIC | Age: 47
End: 2022-04-13

## 2022-04-13 RX ORDER — LISINOPRIL 40 MG/1
40 TABLET ORAL DAILY
Qty: 30 TABLET | Refills: 1 | Status: SHIPPED | OUTPATIENT
Start: 2022-04-13

## 2022-04-13 RX ORDER — HYDROCHLOROTHIAZIDE 12.5 MG/1
12.5 CAPSULE, GELATIN COATED ORAL DAILY
Qty: 30 CAPSULE | Refills: 1 | Status: SHIPPED | OUTPATIENT
Start: 2022-04-13

## 2022-04-13 NOTE — TELEPHONE ENCOUNTER
Agreed, should eat in the morning regardless of glucose - do not want to avoid eating to improve control just make better food choices to reduce the \"spikes\"

## 2022-04-14 RX ORDER — ATORVASTATIN CALCIUM 20 MG/1
TABLET, FILM COATED ORAL
Qty: 90 TABLET | Refills: 0 | Status: SHIPPED | OUTPATIENT
Start: 2022-04-14

## 2022-04-14 NOTE — TELEPHONE ENCOUNTER
PASSED per protocol, refill sent.     Last PE: 3--CB    Future Appointments   Date Time Provider [facility]   5/5/2022  8:30 AM Robbie Zarate PA-C [facility]   5/20/2022  7:30 AM JAIME [place] [facility] VIRY

## 2022-04-15 ENCOUNTER — TELEPHONE (OUTPATIENT)
Dept: INTERNAL MEDICINE CLINIC | Facility: CLINIC | Age: 47
End: 2022-04-15

## 2022-04-15 RX ORDER — AMLODIPINE BESYLATE 5 MG/1
5 TABLET ORAL DAILY
Qty: 30 TABLET | Refills: 0 | Status: SHIPPED | OUTPATIENT
Start: 2022-04-15 | End: 2022-05-13

## 2022-04-15 NOTE — TELEPHONE ENCOUNTER
When patient saw CB recently she stated that they were going in increase the Metoprolol. Has this been done? ?

## 2022-04-15 NOTE — TELEPHONE ENCOUNTER
Wanted to check labs prior to change. Add Amlodipine 5 mg PO dialy. Rx sent. Sodium is borderline low so I cannot increased hydrochlorothiazide. Pulse was in the 60s on last check so do not want to increase Metoprolol as it will further lower HR.

## 2022-04-15 NOTE — TELEPHONE ENCOUNTER
LOV with CB 4/7/2022; scheduled with CB 5/5/2022 for follow up. Essential hypertension - uncontrolled. Take meds regularly and return in 4 weeks for BP check. Patient notified to continue as previous and to be addressed at upcoming visit. Patient states he thought CB wanted differently - sent to [facility] for review and clarification.

## 2022-04-15 NOTE — TELEPHONE ENCOUNTER
Patient notified monitoring BP with new medication. He will call with any questions or concerns. He will log BP once daily and bring log and cuff to visit with CB. encounter closed.

## 2022-05-05 ENCOUNTER — TELEPHONE (OUTPATIENT)
Dept: ENDOCRINOLOGY CLINIC | Facility: CLINIC | Age: 47
End: 2022-05-05

## 2022-05-05 NOTE — TELEPHONE ENCOUNTER
Patient called stating he has 1 Farxiga tablet left and not sure if his provider wants to increase dose or keep the same. Patient reports tolerating medication well. He also said he received a call about putting a 2 week monitor on to check on his BS trends. Pt is scheduled for nurse visit on Friday 5/6/22 to  two samples boxes of Farxiga 10mg and have Kady Pro placed. He will return to see JAIME Thompson on Friday 5/20/22 to review trends and titration of meds if needed.

## 2022-05-06 ENCOUNTER — NURSE ONLY (OUTPATIENT)
Dept: ENDOCRINOLOGY CLINIC | Facility: CLINIC | Age: 47
End: 2022-05-06
Payer: MEDICAID

## 2022-05-06 ENCOUNTER — LAB ENCOUNTER (OUTPATIENT)
Dept: LAB | Age: 47
End: 2022-05-06
Attending: PHYSICIAN ASSISTANT
Payer: MEDICAID

## 2022-05-06 LAB
ANION GAP SERPL CALC-SCNC: 7 MMOL/L (ref 0–18)
BUN BLD-MCNC: 24 MG/DL (ref 7–18)
CALCIUM BLD-MCNC: 9.4 MG/DL (ref 8.5–10.1)
CHLORIDE SERPL-SCNC: 105 MMOL/L (ref 98–112)
CO2 SERPL-SCNC: 24 MMOL/L (ref 21–32)
CREAT BLD-MCNC: 1.04 MG/DL
FASTING STATUS PATIENT QL REPORTED: YES
GLUCOSE BLD-MCNC: 113 MG/DL (ref 70–99)
OSMOLALITY SERPL CALC.SUM OF ELEC: 287 MOSM/KG (ref 275–295)
POTASSIUM SERPL-SCNC: 4.9 MMOL/L (ref 3.5–5.1)
SODIUM SERPL-SCNC: 136 MMOL/L (ref 136–145)

## 2022-05-06 PROCEDURE — 36415 COLL VENOUS BLD VENIPUNCTURE: CPT | Performed by: PHYSICIAN ASSISTANT

## 2022-05-06 PROCEDURE — 80048 BASIC METABOLIC PNL TOTAL CA: CPT | Performed by: PHYSICIAN ASSISTANT

## 2022-05-06 NOTE — PROGRESS NOTES
A continuous glucose sensor for 24 hour blood sugar monitoring was placed on patient today per APN order. Serial NUMBER: [id number]         Exp date: 7/31/2022  After cleansing skin with alcohol prep, Sensor (F98)was inserted on   Posterior upper arm area without difficulty. Small amount of skin prep was added around sensor tape after placement to help with sensor adhesive. Area remains free of any bleeding or irritation or pain at site when patient left DM center. Patient instructions:   Record daily food/drink intake and activity in log book  Call Diabetes center with any questions or concerns.

## 2022-05-11 ENCOUNTER — OFFICE VISIT (OUTPATIENT)
Dept: INTERNAL MEDICINE CLINIC | Facility: CLINIC | Age: 47
End: 2022-05-11
Payer: MEDICAID

## 2022-05-11 VITALS
HEART RATE: 88 BPM | DIASTOLIC BLOOD PRESSURE: 88 MMHG | BODY MASS INDEX: 37.11 KG/M2 | WEIGHT: 274 LBS | RESPIRATION RATE: 12 BRPM | SYSTOLIC BLOOD PRESSURE: 122 MMHG | OXYGEN SATURATION: 98 % | TEMPERATURE: 98 F | HEIGHT: 72 IN

## 2022-05-11 DIAGNOSIS — I10 ESSENTIAL HYPERTENSION: Primary | ICD-10-CM

## 2022-05-11 DIAGNOSIS — E11.3513 PROLIFERATIVE DIABETIC RETINOPATHY OF BOTH EYES WITH MACULAR EDEMA ASSOCIATED WITH TYPE 2 DIABETES MELLITUS (HCC): ICD-10-CM

## 2022-05-11 DIAGNOSIS — E11.21 TYPE 2 DIABETES MELLITUS WITH DIABETIC NEPHROPATHY, WITHOUT LONG-TERM CURRENT USE OF INSULIN (HCC): ICD-10-CM

## 2022-05-11 PROCEDURE — 99213 OFFICE O/P EST LOW 20 MIN: CPT | Performed by: PHYSICIAN ASSISTANT

## 2022-05-11 PROCEDURE — 3008F BODY MASS INDEX DOCD: CPT | Performed by: PHYSICIAN ASSISTANT

## 2022-05-11 PROCEDURE — 3074F SYST BP LT 130 MM HG: CPT | Performed by: PHYSICIAN ASSISTANT

## 2022-05-11 PROCEDURE — 3079F DIAST BP 80-89 MM HG: CPT | Performed by: PHYSICIAN ASSISTANT

## 2022-05-13 RX ORDER — AMLODIPINE BESYLATE 5 MG/1
TABLET ORAL
Qty: 30 TABLET | Refills: 0 | Status: SHIPPED | OUTPATIENT
Start: 2022-05-13

## 2022-05-13 NOTE — TELEPHONE ENCOUNTER
PASSED per protocol, refill sent. Last PE--3--CB    Future Appointments   Date Time Provider Guille Hughes   5/20/2022  7:30 AM JAIME Adams [facility]      Essential hypertension  (primary encounter diagnosis) - Improved control, continue current meds. Encouraged pt to continue to follow his BP. Advised him to expect his BP may fall as he loses weight. Discussed sxs of low BP to watch for. Notify me if questions or problems.

## 2022-05-14 RX ORDER — LANCETS 33 GAUGE
EACH MISCELLANEOUS
Qty: 100 EACH | Refills: 0 | Status: SHIPPED | OUTPATIENT
Start: 2022-05-14

## 2022-05-18 DIAGNOSIS — I10 ESSENTIAL HYPERTENSION: ICD-10-CM

## 2022-05-19 RX ORDER — METOPROLOL SUCCINATE 100 MG/1
TABLET, EXTENDED RELEASE ORAL
Qty: 30 TABLET | Refills: 0 | Status: SHIPPED | OUTPATIENT
Start: 2022-05-19

## 2022-05-19 NOTE — TELEPHONE ENCOUNTER
PASSED per protocol, refill sent.     Last PE--3-- CB      Please call patient to schedule for physical    Future Appointments   Date Time Provider Guille Hughes   5/20/2022  7:30 AM JAIME Winston [facility]

## 2022-05-20 ENCOUNTER — OFFICE VISIT (OUTPATIENT)
Dept: ENDOCRINOLOGY CLINIC | Facility: CLINIC | Age: 47
End: 2022-05-20
Payer: MEDICAID

## 2022-05-20 VITALS — DIASTOLIC BLOOD PRESSURE: 88 MMHG | SYSTOLIC BLOOD PRESSURE: 132 MMHG

## 2022-05-20 DIAGNOSIS — E11.21 TYPE 2 DIABETES MELLITUS WITH DIABETIC NEPHROPATHY, WITHOUT LONG-TERM CURRENT USE OF INSULIN (HCC): Primary | ICD-10-CM

## 2022-05-20 PROCEDURE — 3075F SYST BP GE 130 - 139MM HG: CPT | Performed by: NURSE PRACTITIONER

## 2022-05-20 PROCEDURE — 99214 OFFICE O/P EST MOD 30 MIN: CPT | Performed by: NURSE PRACTITIONER

## 2022-05-20 PROCEDURE — 95251 CONT GLUC MNTR ANALYSIS I&R: CPT | Performed by: NURSE PRACTITIONER

## 2022-05-20 PROCEDURE — 3079F DIAST BP 80-89 MM HG: CPT | Performed by: NURSE PRACTITIONER

## 2022-05-21 ENCOUNTER — PATIENT MESSAGE (OUTPATIENT)
Dept: ENDOCRINOLOGY CLINIC | Facility: CLINIC | Age: 47
End: 2022-05-21

## 2022-05-23 RX ORDER — BLOOD-GLUCOSE METER
EACH MISCELLANEOUS
Qty: 1 KIT | Refills: 1 | Status: SHIPPED | OUTPATIENT
Start: 2022-05-23

## 2022-05-23 RX ORDER — LANCETS 30 GAUGE
2 EACH MISCELLANEOUS
Qty: 200 EACH | Refills: 3 | Status: SHIPPED | OUTPATIENT
Start: 2022-05-23

## 2022-05-23 RX ORDER — BLOOD SUGAR DIAGNOSTIC
STRIP MISCELLANEOUS
Qty: 200 STRIP | Refills: 2 | Status: SHIPPED | OUTPATIENT
Start: 2022-05-23 | End: 2023-05-23

## 2022-06-06 DIAGNOSIS — I10 ESSENTIAL HYPERTENSION: ICD-10-CM

## 2022-06-07 RX ORDER — LISINOPRIL 40 MG/1
TABLET ORAL
Qty: 30 TABLET | Refills: 1 | Status: SHIPPED | OUTPATIENT
Start: 2022-06-07

## 2022-06-07 RX ORDER — HYDROCHLOROTHIAZIDE 12.5 MG/1
CAPSULE, GELATIN COATED ORAL
Qty: 30 CAPSULE | Refills: 1 | Status: SHIPPED | OUTPATIENT
Start: 2022-06-07

## 2022-06-07 NOTE — TELEPHONE ENCOUNTER
PASSED per protocol    Last PE: 3--CB    Last ov: 5--cb-f/u    Rtc: 6 months     No future appointments.

## 2022-06-13 DIAGNOSIS — I10 ESSENTIAL HYPERTENSION: ICD-10-CM

## 2022-06-13 RX ORDER — AMLODIPINE BESYLATE 5 MG/1
TABLET ORAL
Qty: 30 TABLET | Refills: 0 | Status: SHIPPED | OUTPATIENT
Start: 2022-06-13

## 2022-06-14 RX ORDER — METOPROLOL SUCCINATE 100 MG/1
TABLET, EXTENDED RELEASE ORAL
Qty: 30 TABLET | Refills: 0 | Status: SHIPPED | OUTPATIENT
Start: 2022-06-14

## 2022-07-08 DIAGNOSIS — E11.65 UNCONTROLLED TYPE 2 DIABETES MELLITUS WITH HYPERGLYCEMIA (HCC): Primary | ICD-10-CM

## 2022-07-08 RX ORDER — AMLODIPINE BESYLATE 5 MG/1
TABLET ORAL
Qty: 90 TABLET | Refills: 0 | Status: SHIPPED | OUTPATIENT
Start: 2022-07-08

## 2022-07-08 NOTE — TELEPHONE ENCOUNTER
Refill sent. Please remind pt to do colon cancer screening (FIT card). .  Also, please ask pt to do A1c at his convenience over the next month.   He will not need to fast for this test.

## 2022-07-13 DIAGNOSIS — I10 ESSENTIAL HYPERTENSION: ICD-10-CM

## 2022-07-13 RX ORDER — METOPROLOL SUCCINATE 100 MG/1
TABLET, EXTENDED RELEASE ORAL
Qty: 90 TABLET | Refills: 0 | Status: SHIPPED | OUTPATIENT
Start: 2022-07-13

## 2022-07-13 RX ORDER — ATORVASTATIN CALCIUM 20 MG/1
TABLET, FILM COATED ORAL
Qty: 90 TABLET | Refills: 0 | Status: SHIPPED | OUTPATIENT
Start: 2022-07-13

## 2022-07-31 DIAGNOSIS — I10 ESSENTIAL HYPERTENSION: ICD-10-CM

## 2022-08-02 RX ORDER — HYDROCHLOROTHIAZIDE 12.5 MG/1
CAPSULE, GELATIN COATED ORAL
Qty: 90 CAPSULE | Refills: 0 | Status: SHIPPED | OUTPATIENT
Start: 2022-08-02

## 2022-08-02 RX ORDER — LISINOPRIL 40 MG/1
TABLET ORAL
Qty: 90 TABLET | Refills: 0 | Status: SHIPPED | OUTPATIENT
Start: 2022-08-02

## 2022-10-11 DIAGNOSIS — I10 ESSENTIAL HYPERTENSION: ICD-10-CM

## 2022-10-11 RX ORDER — ATORVASTATIN CALCIUM 20 MG/1
TABLET, FILM COATED ORAL
Qty: 30 TABLET | Refills: 0 | Status: SHIPPED | OUTPATIENT
Start: 2022-10-11

## 2022-10-11 RX ORDER — METOPROLOL SUCCINATE 100 MG/1
TABLET, EXTENDED RELEASE ORAL
Qty: 30 TABLET | Refills: 0 | Status: SHIPPED | OUTPATIENT
Start: 2022-10-11

## 2022-10-11 NOTE — TELEPHONE ENCOUNTER
Pt has still not done FIT cards. Also never did A1c that I asked him to do in July. I sent 30 day supply of meds. Needs to do labs and schedule f/u visit with me.

## 2022-11-10 ENCOUNTER — TELEPHONE (OUTPATIENT)
Dept: INTERNAL MEDICINE CLINIC | Facility: CLINIC | Age: 47
End: 2022-11-10

## 2022-11-10 DIAGNOSIS — I10 ESSENTIAL HYPERTENSION: ICD-10-CM

## 2022-11-10 RX ORDER — METOPROLOL SUCCINATE 100 MG/1
TABLET, EXTENDED RELEASE ORAL
Qty: 30 TABLET | Refills: 0 | Status: SHIPPED | OUTPATIENT
Start: 2022-11-10 | End: 2022-12-12

## 2022-11-10 RX ORDER — ATORVASTATIN CALCIUM 20 MG/1
TABLET, FILM COATED ORAL
Qty: 30 TABLET | Refills: 0 | Status: SHIPPED | OUTPATIENT
Start: 2022-11-10 | End: 2022-12-12

## 2022-11-10 NOTE — TELEPHONE ENCOUNTER
I called pt personally and left a message for him asking him to schedule CPE, f/u visit and get his A1c done. 30 day supply of meds sent so pt does not run out. Please continue to call pt to try to schedule visit.   Thx.

## 2022-11-18 NOTE — TELEPHONE ENCOUNTER
Please continue to reach out X 3. If not able to reach him then a letter needs to be mailed to his home.

## 2022-12-06 NOTE — TELEPHONE ENCOUNTER
Left another message for pt to call back and either schedule an ov or let us know if he has established care with a new provider. No response from patient to multiple outreach. No future appointments. Remove PCP?

## 2022-12-06 NOTE — TELEPHONE ENCOUNTER
Thx.  Please check in 1-2 weeks to see if pt has scheduled appt. If not we will need to move on to next steps.

## 2022-12-10 DIAGNOSIS — I10 ESSENTIAL HYPERTENSION: ICD-10-CM

## 2022-12-12 RX ORDER — METOPROLOL SUCCINATE 100 MG/1
TABLET, EXTENDED RELEASE ORAL
Qty: 30 TABLET | Refills: 0 | Status: SHIPPED | OUTPATIENT
Start: 2022-12-12

## 2022-12-12 RX ORDER — ATORVASTATIN CALCIUM 20 MG/1
TABLET, FILM COATED ORAL
Qty: 30 TABLET | Refills: 0 | Status: SHIPPED | OUTPATIENT
Start: 2022-12-12

## 2022-12-12 NOTE — TELEPHONE ENCOUNTER
I sent 30 day supply. Please reach out to pt again. Obviously he still believes we are still PCP since requesting refill (see other TE for further details regarding this). I will refuse further refills if he does not schedule appt and do A1c. He does not read Ticies messages.

## 2022-12-14 NOTE — TELEPHONE ENCOUNTER
[facility]  [address] [address] [address]  Phone: [phone]    May 3, 2017        Tennille Dietrich  [address] [place] [facility] [place] [facility] [address]          To whom it may concern:    RE: Tennille Dietrich    Patient was seen and treated today at our clinic. Please excuse Tennille from track practice on 5/3-5/6/18.    Please contact me for questions or concerns.      Sincerely,        Gus Caceres PA-C   lvm for patient

## 2022-12-15 DIAGNOSIS — I10 ESSENTIAL HYPERTENSION: ICD-10-CM

## 2022-12-15 DIAGNOSIS — E11.21 TYPE 2 DIABETES MELLITUS WITH DIABETIC NEPHROPATHY, WITHOUT LONG-TERM CURRENT USE OF INSULIN (HCC): ICD-10-CM

## 2022-12-15 NOTE — TELEPHONE ENCOUNTER
Patient was doing very well when I saw him, will fill rx but if at goal and wants to return to PCP for diabetes management please let me know and I will fwd future med requests. If not, pt is due for f/u with me.

## 2022-12-16 RX ORDER — HYDROCHLOROTHIAZIDE 12.5 MG/1
12.5 CAPSULE, GELATIN COATED ORAL DAILY
Qty: 90 CAPSULE | Refills: 0 | OUTPATIENT
Start: 2022-12-16

## 2022-12-16 RX ORDER — AMLODIPINE BESYLATE 5 MG/1
5 TABLET ORAL DAILY
Qty: 90 TABLET | Refills: 0 | OUTPATIENT
Start: 2022-12-16

## 2022-12-16 RX ORDER — LISINOPRIL 40 MG/1
40 TABLET ORAL DAILY
Qty: 90 TABLET | Refills: 0 | OUTPATIENT
Start: 2022-12-16

## 2022-12-16 NOTE — TELEPHONE ENCOUNTER
Patient scheduled for cpe.     Future Appointments   Date Time Provider Guille Ellen   1/3/2023  4:40 PM Shannon James.[facility]

## 2022-12-17 ENCOUNTER — PATIENT MESSAGE (OUTPATIENT)
Dept: INTERNAL MEDICINE CLINIC | Facility: CLINIC | Age: 47
End: 2022-12-17

## 2022-12-17 DIAGNOSIS — I10 ESSENTIAL HYPERTENSION: ICD-10-CM

## 2022-12-19 RX ORDER — LISINOPRIL 40 MG/1
40 TABLET ORAL DAILY
Qty: 30 TABLET | Refills: 0 | Status: SHIPPED | OUTPATIENT
Start: 2022-12-19

## 2022-12-19 RX ORDER — HYDROCHLOROTHIAZIDE 12.5 MG/1
12.5 CAPSULE, GELATIN COATED ORAL DAILY
Qty: 30 CAPSULE | Refills: 0 | Status: SHIPPED | OUTPATIENT
Start: 2022-12-19

## 2022-12-19 RX ORDER — AMLODIPINE BESYLATE 5 MG/1
5 TABLET ORAL DAILY
Qty: 30 TABLET | Refills: 0 | Status: SHIPPED | OUTPATIENT
Start: 2022-12-19

## 2023-01-09 DIAGNOSIS — I10 ESSENTIAL HYPERTENSION: ICD-10-CM

## 2023-01-10 RX ORDER — METOPROLOL SUCCINATE 100 MG/1
TABLET, EXTENDED RELEASE ORAL
Qty: 30 TABLET | Refills: 0 | Status: SHIPPED | OUTPATIENT
Start: 2023-01-10

## 2023-01-10 RX ORDER — ATORVASTATIN CALCIUM 20 MG/1
TABLET, FILM COATED ORAL
Qty: 30 TABLET | Refills: 0 | Status: SHIPPED | OUTPATIENT
Start: 2023-01-10

## 2023-01-17 ENCOUNTER — OFFICE VISIT (OUTPATIENT)
Dept: INTERNAL MEDICINE CLINIC | Facility: CLINIC | Age: 48
End: 2023-01-17
Payer: MEDICAID

## 2023-01-17 VITALS
TEMPERATURE: 98 F | DIASTOLIC BLOOD PRESSURE: 82 MMHG | WEIGHT: 285 LBS | HEART RATE: 62 BPM | BODY MASS INDEX: 38.6 KG/M2 | HEIGHT: 72 IN | SYSTOLIC BLOOD PRESSURE: 140 MMHG

## 2023-01-17 DIAGNOSIS — E11.21 TYPE 2 DIABETES MELLITUS WITH DIABETIC NEPHROPATHY, WITHOUT LONG-TERM CURRENT USE OF INSULIN (HCC): ICD-10-CM

## 2023-01-17 DIAGNOSIS — L60.3 NAIL DYSTROPHY: ICD-10-CM

## 2023-01-17 DIAGNOSIS — M21.41 FLAT FEET: ICD-10-CM

## 2023-01-17 DIAGNOSIS — E78.5 HYPERLIPIDEMIA LDL GOAL <100: ICD-10-CM

## 2023-01-17 DIAGNOSIS — E11.3513 PROLIFERATIVE DIABETIC RETINOPATHY OF BOTH EYES WITH MACULAR EDEMA ASSOCIATED WITH TYPE 2 DIABETES MELLITUS (HCC): ICD-10-CM

## 2023-01-17 DIAGNOSIS — M21.42 FLAT FEET: ICD-10-CM

## 2023-01-17 DIAGNOSIS — M72.2 PLANTAR FASCIITIS: ICD-10-CM

## 2023-01-17 DIAGNOSIS — I10 ESSENTIAL HYPERTENSION: Primary | ICD-10-CM

## 2023-01-17 DIAGNOSIS — R80.9 PROTEINURIA, UNSPECIFIED TYPE: ICD-10-CM

## 2023-01-17 PROBLEM — E66.01 MORBID (SEVERE) OBESITY DUE TO EXCESS CALORIES (HCC): Status: ACTIVE | Noted: 2023-01-17

## 2023-01-17 PROCEDURE — 3008F BODY MASS INDEX DOCD: CPT | Performed by: PHYSICIAN ASSISTANT

## 2023-01-17 PROCEDURE — 99214 OFFICE O/P EST MOD 30 MIN: CPT | Performed by: PHYSICIAN ASSISTANT

## 2023-01-17 PROCEDURE — 3077F SYST BP >= 140 MM HG: CPT | Performed by: PHYSICIAN ASSISTANT

## 2023-01-17 PROCEDURE — 3079F DIAST BP 80-89 MM HG: CPT | Performed by: PHYSICIAN ASSISTANT

## 2023-01-17 RX ORDER — ATORVASTATIN CALCIUM 20 MG/1
20 TABLET, FILM COATED ORAL NIGHTLY
Qty: 90 TABLET | Refills: 1 | Status: SHIPPED | OUTPATIENT
Start: 2023-01-17

## 2023-01-17 RX ORDER — HYDROCHLOROTHIAZIDE 12.5 MG/1
12.5 CAPSULE, GELATIN COATED ORAL DAILY
Qty: 90 CAPSULE | Refills: 1 | Status: SHIPPED | OUTPATIENT
Start: 2023-01-17

## 2023-01-17 RX ORDER — AMLODIPINE BESYLATE 5 MG/1
5 TABLET ORAL DAILY
Qty: 90 TABLET | Refills: 1 | Status: SHIPPED | OUTPATIENT
Start: 2023-01-17

## 2023-01-17 RX ORDER — METOPROLOL SUCCINATE 100 MG/1
100 TABLET, EXTENDED RELEASE ORAL DAILY
Qty: 90 TABLET | Refills: 1 | Status: SHIPPED | OUTPATIENT
Start: 2023-01-17

## 2023-01-17 RX ORDER — LISINOPRIL 40 MG/1
40 TABLET ORAL DAILY
Qty: 90 TABLET | Refills: 1 | Status: SHIPPED | OUTPATIENT
Start: 2023-01-17

## 2023-02-14 ENCOUNTER — TELEPHONE (OUTPATIENT)
Dept: INTERNAL MEDICINE CLINIC | Facility: CLINIC | Age: 48
End: 2023-02-14

## 2023-02-15 NOTE — TELEPHONE ENCOUNTER
Pt needs to complete labs (fasting), last A1c was 4/22. Monika Both ordered when she saw him in Jan of 2023. Please remind pt to do ASAP. [address].

## 2023-03-15 NOTE — TELEPHONE ENCOUNTER
Called pt to let him know he is due for fup at the end of the month and he said he is out of town until then. He will call us to make appt when back in town. 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 Treated with IV ABX and transferred to [facility] for surgical intervention. on 11/22/22 she underwent successful mitral valve vegetation resection and replacement with 31 mm tissue epic valve. VSD closed primarily. \par \par Patient recovered well, followed with CT Sx and ID on regular basis, finished full course of IV ABx as recommended by ID and pic line removed.\par Repeated echo on 2/2023. showed normal EF, prosthetic mitral valve in place functioning normally \par \par later on patient get pregnant currently ~ 11 weeks, eliquis was stopped (she already received for 3 months)\par Currently patient feel much better, denies chest pain, shortness of breath , TINAJERO and palpitations no syncope. \par  BP controlled. \par \par EKG showed sinus rhythm first degree AV block no ischemic changes.

## 2023-05-01 ENCOUNTER — TELEPHONE (OUTPATIENT)
Dept: ORTHOPEDICS CLINIC | Facility: CLINIC | Age: 48
End: 2023-05-01

## 2023-05-01 DIAGNOSIS — M54.50 LOW BACK PAIN, UNSPECIFIED BACK PAIN LATERALITY, UNSPECIFIED CHRONICITY, UNSPECIFIED WHETHER SCIATICA PRESENT: Primary | ICD-10-CM

## 2023-05-01 NOTE — TELEPHONE ENCOUNTER
Future Appointments   Date Time Provider Guille Ellen   5/2/2023  9:30 AM N[facility] XR [facility]1 [facility] [place]   5/2/2023 10:15 AM Adilene Santos MD [facility]       Patient is scheduled for xray and advised to complete prior to appt.

## 2023-05-02 ENCOUNTER — OFFICE VISIT (OUTPATIENT)
Dept: ORTHOPEDICS CLINIC | Facility: CLINIC | Age: 48
End: 2023-05-02
Payer: MEDICAID

## 2023-05-02 ENCOUNTER — HOSPITAL ENCOUNTER (OUTPATIENT)
Dept: GENERAL RADIOLOGY | Age: 48
Discharge: HOME | End: 2023-05-02
Attending: STUDENT IN AN ORGANIZED HEALTH CARE EDUCATION/TRAINING PROGRAM
Payer: MEDICAID

## 2023-05-02 ENCOUNTER — TELEPHONE (OUTPATIENT)
Dept: ORTHOPEDICS CLINIC | Facility: CLINIC | Age: 48
End: 2023-05-02

## 2023-05-02 VITALS — BODY MASS INDEX: 35.78 KG/M2 | HEIGHT: 73 IN | WEIGHT: 270 LBS

## 2023-05-02 DIAGNOSIS — M54.41 ACUTE MIDLINE LOW BACK PAIN WITH BILATERAL SCIATICA: ICD-10-CM

## 2023-05-02 DIAGNOSIS — M54.50 LOW BACK PAIN, UNSPECIFIED BACK PAIN LATERALITY, UNSPECIFIED CHRONICITY, UNSPECIFIED WHETHER SCIATICA PRESENT: ICD-10-CM

## 2023-05-02 DIAGNOSIS — M54.42 ACUTE MIDLINE LOW BACK PAIN WITH BILATERAL SCIATICA: ICD-10-CM

## 2023-05-02 DIAGNOSIS — M54.2 NECK PAIN: Primary | ICD-10-CM

## 2023-05-02 DIAGNOSIS — M54.2 NECK PAIN: ICD-10-CM

## 2023-05-02 PROCEDURE — 3008F BODY MASS INDEX DOCD: CPT | Performed by: STUDENT IN AN ORGANIZED HEALTH CARE EDUCATION/TRAINING PROGRAM

## 2023-05-02 PROCEDURE — 72100 X-RAY EXAM L-S SPINE 2/3 VWS: CPT | Performed by: STUDENT IN AN ORGANIZED HEALTH CARE EDUCATION/TRAINING PROGRAM

## 2023-05-02 PROCEDURE — 99203 OFFICE O/P NEW LOW 30 MIN: CPT | Performed by: STUDENT IN AN ORGANIZED HEALTH CARE EDUCATION/TRAINING PROGRAM

## 2023-05-02 PROCEDURE — 72050 X-RAY EXAM NECK SPINE 4/5VWS: CPT | Performed by: STUDENT IN AN ORGANIZED HEALTH CARE EDUCATION/TRAINING PROGRAM

## 2023-05-02 NOTE — TELEPHONE ENCOUNTER
LOV today 5/2/23    Pt states PT will not start until May 19th and was told to let us know if could not get in sooner.

## 2023-05-02 NOTE — TELEPHONE ENCOUNTER
Patient is stating that he was to call Dr Sharlene Habermann office if the PT dept was unable to schedule him right away. Eval Date is May 19th. Please Advise.      Future Appointments   Date Time Provider Guille Hughes   5/19/2023  9:30 AM Levar Cortez, PT [facility]   5/23/2023  7:15 AM Levar Cortez PT [facility]   5/26/2023  7:15 AM Levar Cortez, PT [facility] PH[facility]   6/2/2023  7:15 AM Magno Mulligan, PT [facility]   6/6/2023  7:15 AM Levar Cortez PT [facility]   6/9/2023  8:00 AM Levar Cortez PT [facility] PH[facility] AT [facility]   6/13/2023  7:15 AM Levar Cortez PT [facility] PHYS [facility] AT [facility]   6/20/2023  7:15 AM Levar Cortez PT [facility]

## 2023-05-04 NOTE — TELEPHONE ENCOUNTER
Future Appointments   Date Time Provider Guille Hughes   5/5/2023 10:15 AM Lilibeth Snow, PT [address] [facility]   5/8/2023 10:15 AM Lilibeth Snow, PT [address] [facility]   5/19/2023  9:30 AM Lilibeth Snow, PT [address] [facility]   5/23/2023  7:15 AM Lilibeth Snow, PT [address] [facility]   5/26/2023  7:15 AM Lilibeth Snow, PT [address] [facility] PHYS [facility] AT [facility]   6/2/2023  7:15 AM Mino Corey, PT [address] [facility] PHYS [facility] AT [facility]   6/6/2023  7:15 AM Lilibeth Snow, PT [address] [facility] AT [facility]   6/9/2023  8:00 AM Lilibeth Snow, PT [address] [facility] PHYS [facility] AT [facility] AT [facility]   6/13/2023  7:15 AM Lilibeth Snow, PT [address] [facility]   6/20/2023  7:15 AM Lilibeth Snow, PT [address] [facility]

## 2023-05-05 ENCOUNTER — OFFICE VISIT (OUTPATIENT)
Dept: PHYSICAL THERAPY | Facility: HOSPITAL | Age: 48
End: 2023-05-05
Attending: STUDENT IN AN ORGANIZED HEALTH CARE EDUCATION/TRAINING PROGRAM
Payer: MEDICAID

## 2023-05-05 PROCEDURE — 97110 THERAPEUTIC EXERCISES: CPT

## 2023-05-05 PROCEDURE — 97163 PT EVAL HIGH COMPLEX 45 MIN: CPT

## 2023-05-08 ENCOUNTER — OFFICE VISIT (OUTPATIENT)
Dept: PHYSICAL THERAPY | Facility: HOSPITAL | Age: 48
End: 2023-05-08
Attending: STUDENT IN AN ORGANIZED HEALTH CARE EDUCATION/TRAINING PROGRAM
Payer: MEDICAID

## 2023-05-08 PROCEDURE — 97110 THERAPEUTIC EXERCISES: CPT

## 2023-05-08 PROCEDURE — 97140 MANUAL THERAPY 1/> REGIONS: CPT
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
## 2023-05-12 ENCOUNTER — OFFICE VISIT (OUTPATIENT)
Dept: PHYSICAL THERAPY | Facility: HOSPITAL | Age: 48
End: 2023-05-12
Attending: STUDENT IN AN ORGANIZED HEALTH CARE EDUCATION/TRAINING PROGRAM
Payer: MEDICAID

## 2023-05-12 PROCEDURE — 97110 THERAPEUTIC EXERCISES: CPT

## 2023-05-12 PROCEDURE — 97140 MANUAL THERAPY 1/> REGIONS: CPT
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
## 2023-05-19 ENCOUNTER — APPOINTMENT (OUTPATIENT)
Dept: PHYSICAL THERAPY | Facility: HOSPITAL | Age: 48
End: 2023-05-19
Attending: STUDENT IN AN ORGANIZED HEALTH CARE EDUCATION/TRAINING PROGRAM
Payer: MEDICAID

## 2023-05-23 ENCOUNTER — OFFICE VISIT (OUTPATIENT)
Dept: PHYSICAL THERAPY | Facility: HOSPITAL | Age: 48
End: 2023-05-23
Attending: STUDENT IN AN ORGANIZED HEALTH CARE EDUCATION/TRAINING PROGRAM
Payer: MEDICAID

## 2023-05-23 PROCEDURE — 97140 MANUAL THERAPY 1/> REGIONS: CPT

## 2023-05-23 PROCEDURE — 97110 THERAPEUTIC EXERCISES: CPT

## 2023-05-23 NOTE — PROGRESS NOTES
Diagnosis:   Acute midline low back pain with bilateral sciatica (M54.42,M54.41)    Referring Provider: Rebeca Chavez  Date of Evaluation:    5/5/23    Precautions:  None Next MD visit:   none scheduled  Date of Surgery: n/a   Insurance Primary/Secondary: BLUE CROSS MEDICAID / N/A     # Auth Visits: 10 auth            Subjective: The patient reports that according to the neurologist he has a large herniated disc at L4-5, if PT does not help with this then he may need surgery. He states that his neck feels looser, his low back feels like it is getting worse. Feels the neck a lot in the center, it pops when he moves. Pt got an order for chiropractor from the neurologist as well, and he follows up with his pain doctor this week. Pt provided MRI results on his phone for viewing. Lumbar MRI: L4-5: posterior disc bulge, L paracentral disc protrusion with annular tear. Mass effect upon the ventral aspect of the thecal sac and impingement upon the L5 nerve roots. Crowding of the cauda equina nerve roots. Bilateral inferior foraminal narrowing    Cervical: acquired and congenital canal stenosis. Foraminal stenosis multilevel, worse at C6-7    Thoracic: multilevel degenerative findings. Degenerative spurring T8-9     Pain: 4-5/10      Objective:     Lumbar extension: <5 deg    Observation: pt has limited AROM into lumbar extension in standing, in prone he is able to reach greater degrees of extension. Assessment: Shortened session as pt was 10 min late. Summarized MRI findings in subjective section of this note. The patient has a herniated disc at L4-5 which is likely contributing to his back pain wiht L radicular symptoms. As Harley Hernandez has increased pain with standing extension, performed traction to attempt to achieve centralization of symptoms. The patient had increased tolerance for lumbar extension when this was performed in prone. Issued prone press up as an exercise.  Noted improvements in standing lumbar extension following the traction/prone on elbows stretch. Goals:   (to be met in 10 visits)   1. The patient will demonstrate at least 40 deg standing lumbar flexion to improve ability to perform bending and squatting. 2. The patient will demonstrate at least 10 deg lumbar extension AROM  3. The patient will demonstrate at least 75 deg cervical rotation AROM bilaterally  4. The patient will demonstrate equal stance time bilaterally with ambulation in the clinic  5. The patient will report being able to perform bed mobility without increase in low back pain  6. The patient will be independent and adherent in a comprehensive HEP    Plan: continue traction and extension based exercises. Date: 5/8/2023  TX#: 2/10 Date: 5/12/2023           TX#: 3/10 Date: 5/23/2023            TX#: 4/10 Date:                 TX#: 5/ Date: Tx#: 6/   Manual therapy  PA assessment in cervical, thoracic, lumbar spine, tried grade II mobilizations, DC in each area due to pain  x8 min Manual therapy  Tried grade III lumbar rotation mobs, DC due to pain, modified to grade II  UC traction (DC due to sharp R cervical pain)  MWM R rotation snag x5 Manual therapy  Manual long axis traction with belt, 10 min     ther ex  Seated ball rolls 2x3  Standing hip abduction 2x ea  Muscle energy for SI joint 5x ea direction  Swiss ball knee to chest 10x ther ex  Upper trunk rotation open book 5x ea  Lunge stretch on 6\" box 5x ea  R rotation MWM, discussed performing rotation AROM at home as able. ther ex  LTR 10x  Bridge 5x  Prone press up 10x     X X X     X X X     HEP:   Access Code: [email or web address]  URL: [email or web address]  Date: 05/23/2023  Prepared by: Shashi Rowlett    Exercises  - Supine Posterior Pelvic Tilt  - 3 x daily - 7 x weekly - 1 sets - 10 reps  - Lower Trunk Rotations  - 3 x daily - 7 x weekly - 1 sets - 10 reps  - Supine Figure 4 Piriformis Stretch  - 3 x daily - 7 x weekly - 1 sets - 3 reps - 10-20 sec hold  - Prone on Elbows Stretch  - 1 x daily - 7 x weekly - 1 sets - 10 reps    Charges: manual therapy x1, therapeutic exercise x1      Total Timed Treatment: 32 min  Total Treatment Time: 32 min

## 2023-05-26 ENCOUNTER — OFFICE VISIT (OUTPATIENT)
Dept: PHYSICAL THERAPY | Facility: HOSPITAL | Age: 48
End: 2023-05-26
Attending: STUDENT IN AN ORGANIZED HEALTH CARE EDUCATION/TRAINING PROGRAM
Payer: MEDICAID

## 2023-05-26 PROCEDURE — 97110 THERAPEUTIC EXERCISES: CPT

## 2023-05-26 PROCEDURE — 97140 MANUAL THERAPY 1/> REGIONS: CPT

## 2023-05-26 NOTE — PROGRESS NOTES
Diagnosis:   Acute midline low back pain with bilateral sciatica (M54.42,M54.41)    Referring Provider: Corey Meza  Date of Evaluation:    5/5/23    Precautions:  None Next MD visit:   none scheduled  Date of Surgery: n/a   Insurance Primary/Secondary: BLUE CROSS MEDICAID / N/A     # Auth Visits: 10 auth            Subjective: The patient reports that after the last session the pain increased for about 2 hours afterwards, but then it switched and he felt the best that he felt the rest of the day. He states that hte next day he woke up and was back to square 1. Pt saw the pain management doctor yesterday. He states that he is scheduled for injections next Thursday in the lumbar spine, will hold off on other areas of the spine. The thoracic disc is pushing into the spinal cord a little bit and in the neck as well. He will get injections in lumbar L4-5 next week. Lumbar MRI: L4-5: posterior disc bulge, L paracentral disc protrusion with annular tear. Mass effect upon the ventral aspect of the thecal sac and impingement upon the L5 nerve roots. Crowding of the cauda equina nerve roots. Bilateral inferior foraminal narrowing    Cervical: acquired and congenital canal stenosis. Foraminal stenosis multilevel, worse at C6-7    Thoracic: multilevel degenerative findings. Degenerative spurring T8-9     Pain: 5/10      Objective:     Lumbar extension: <5 deg    Observation: pt has limited AROM into lumbar extension in standing, in prone he is able to reach greater degrees of extension. Assessment: The patient noted relief after initial flare up after the manual traction performed at last session. He did not have any pain with the traction performed today. Encouraged the patient to continue with the extension exercises. Goals:   (to be met in 10 visits)   1. The patient will demonstrate at least 40 deg standing lumbar flexion to improve ability to perform bending and squatting.   2. The patient will demonstrate at least 10 deg lumbar extension AROM  3. The patient will demonstrate at least 75 deg cervical rotation AROM bilaterally  4. The patient will demonstrate equal stance time bilaterally with ambulation in the clinic  5. The patient will report being able to perform bed mobility without increase in low back pain  6. The patient will be independent and adherent in a comprehensive HEP    Plan: continue traction and extension based exercises. Date: 5/8/2023  TX#: 2/10 Date: 5/12/2023           TX#: 3/10 Date: 5/23/2023            TX#: 4/10 Date: 5/26/2023          TX#: 5/10 Date: Tx#: 6/   Manual therapy  PA assessment in cervical, thoracic, lumbar spine, tried grade II mobilizations, DC in each area due to pain  x8 min Manual therapy  Tried grade III lumbar rotation mobs, DC due to pain, modified to grade II  UC traction (DC due to sharp R cervical pain)  MWM R rotation snag x5 Manual therapy  Manual long axis traction with belt, 10 min Manual therapy  Manual long axis traction with belt, 10 min      ther ex  Seated ball rolls 2x3  Standing hip abduction 2x ea  Muscle energy for SI joint 5x ea direction  Swiss ball knee to chest 10x ther ex  Upper trunk rotation open book 5x ea  Lunge stretch on 6\" box 5x ea  R rotation MWM, discussed performing rotation AROM at home as able. ther ex  LTR 10x  Bridge 5x  Prone press up 10x ther ex  Thoracic extension over half FR/towel roll 2x  Towel slide on door 10x  Low row 15x green TB  sidelying thoracic rotation hand behind head 10x ea  Bridge 5x  Prone extension 10x    X X X X    X X X X    HEP:   Access Code: [email or web address]  URL: [email or web address]  Date: 05/23/2023  Prepared by: Kanwal Berrios    Exercises  - Supine Posterior Pelvic Tilt  - 3 x daily - 7 x weekly - 1 sets - 10 reps  - Lower Trunk Rotations  - 3 x daily - 7 x weekly - 1 sets - 10 reps  - Supine Figure 4 Piriformis Stretch  - 3 x daily - 7 x weekly - 1 sets - 3 reps - 10-20 sec hold  - Prone on Elbows Stretch  - 1 x daily - 7 x weekly - 1 sets - 10 reps  Low row on doorway, towel slide on door    Charges: manual therapy x1, therapeutic exercise x2      Total Timed Treatment: 40 min  Total Treatment Time: 40 min

## 2023-06-02 ENCOUNTER — APPOINTMENT (OUTPATIENT)
Dept: PHYSICAL THERAPY | Facility: HOSPITAL | Age: 48
End: 2023-06-02
Attending: STUDENT IN AN ORGANIZED HEALTH CARE EDUCATION/TRAINING PROGRAM
Payer: MEDICAID

## 2023-06-06 ENCOUNTER — TELEPHONE (OUTPATIENT)
Dept: PHYSICAL THERAPY | Facility: HOSPITAL | Age: 48
End: 2023-06-06

## 2023-06-06 ENCOUNTER — APPOINTMENT (OUTPATIENT)
Dept: PHYSICAL THERAPY | Facility: HOSPITAL | Age: 48
End: 2023-06-06
Attending: STUDENT IN AN ORGANIZED HEALTH CARE EDUCATION/TRAINING PROGRAM
Payer: MEDICAID

## 2023-06-09 ENCOUNTER — APPOINTMENT (OUTPATIENT)
Dept: PHYSICAL THERAPY | Facility: HOSPITAL | Age: 48
End: 2023-06-09
Attending: STUDENT IN AN ORGANIZED HEALTH CARE EDUCATION/TRAINING PROGRAM
Payer: MEDICAID

## 2023-06-13 ENCOUNTER — APPOINTMENT (OUTPATIENT)
Dept: PHYSICAL THERAPY | Facility: HOSPITAL | Age: 48
End: 2023-06-13
Attending: STUDENT IN AN ORGANIZED HEALTH CARE EDUCATION/TRAINING PROGRAM
Payer: MEDICAID

## 2023-06-13 ENCOUNTER — TELEPHONE (OUTPATIENT)
Dept: PHYSICAL THERAPY | Facility: HOSPITAL | Age: 48
End: 2023-06-13

## 2023-06-13 ENCOUNTER — TELEPHONE (OUTPATIENT)
Dept: INTERNAL MEDICINE CLINIC | Facility: CLINIC | Age: 48
End: 2023-06-13

## 2023-06-13 NOTE — TELEPHONE ENCOUNTER
Left a voicemail regarding the patient's missed appointment this morning. Asked for a call back if he is unable to make his next scheduled appointment. This is the patient's 2nd no show in a row, informed the patient of cancellation/no show policy. Informed him that if he misses another appointment we will discharge.

## 2023-06-20 ENCOUNTER — APPOINTMENT (OUTPATIENT)
Dept: PHYSICAL THERAPY | Facility: HOSPITAL | Age: 48
End: 2023-06-20
Attending: STUDENT IN AN ORGANIZED HEALTH CARE EDUCATION/TRAINING PROGRAM
Payer: MEDICAID

## 2023-07-13 ENCOUNTER — TELEPHONE (OUTPATIENT)
Dept: INTERNAL MEDICINE CLINIC | Facility: CLINIC | Age: 48
End: 2023-07-13

## 2023-07-13 DIAGNOSIS — E11.21 TYPE 2 DIABETES MELLITUS WITH DIABETIC NEPHROPATHY, WITHOUT LONG-TERM CURRENT USE OF INSULIN (HCC): ICD-10-CM

## 2023-07-13 DIAGNOSIS — I10 ESSENTIAL HYPERTENSION: ICD-10-CM

## 2023-07-14 RX ORDER — HYDROCHLOROTHIAZIDE 12.5 MG/1
12.5 CAPSULE, GELATIN COATED ORAL DAILY
Qty: 30 CAPSULE | Refills: 0 | Status: SHIPPED | OUTPATIENT
Start: 2023-07-14

## 2023-07-14 RX ORDER — AMLODIPINE BESYLATE 5 MG/1
5 TABLET ORAL DAILY
Qty: 30 TABLET | Refills: 0 | Status: SHIPPED | OUTPATIENT
Start: 2023-07-14 | End: 2023-08-16

## 2023-07-14 NOTE — TELEPHONE ENCOUNTER
Last VISIT Acute 1/17/23 C. S. Last CPE 03/24/21 C.B. Last REFILL  amLODIPine 5 MG Oral Tab 90 tablet 1 1/17/2023    Sig:   Take 1 tablet (5 mg total) by mouth daily. hydroCHLOROthiazide 12.5 MG Oral Cap 90 capsule 1 1/17/2023    Sig:   Take 1 capsule (12.5 mg total) by mouth daily. Last LABS  A1C, Lipid, CMP 01/17/2023    No future appointments. Per PROTOCOL?       Hypertension Medications Protocol [id number]/13/2023 08:49 PM   Protocol Details CMP or BMP in past 12 months    Last serum creatinine< 2.0    Appointment in past 6 or next 3 months

## 2023-07-14 NOTE — TELEPHONE ENCOUNTER
30 day supply sent. Pt is overdue for CPE, follow up with [facility]AP[facility] to address. Unsure who he will want to see.   Saw CS last.

## 2023-07-14 NOTE — TELEPHONE ENCOUNTER
Requested Prescriptions     Pending Prescriptions Disp Refills    FARXIGA 10 MG Oral Tab [Pharmacy Med Name: FARXIGA 10MG TABLETS] 90 tablet 1     Sig: TAKE 1 TABLET(10 MG) BY MOUTH DAILY     Last A1c value was 8.7% done 4/6/2022.     Refill   LOV 5/20/22    Pt does not have a FU appt scheduled with us, message pt via [facility]
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
## 2023-07-19 ENCOUNTER — TELEMEDICINE (OUTPATIENT)
Dept: ENDOCRINOLOGY CLINIC | Facility: CLINIC | Age: 48
End: 2023-07-19
Payer: MEDICAID

## 2023-07-19 DIAGNOSIS — E11.65 TYPE 2 DIABETES MELLITUS WITH HYPERGLYCEMIA, WITHOUT LONG-TERM CURRENT USE OF INSULIN (HCC): Primary | ICD-10-CM

## 2023-07-19 PROCEDURE — 99215 OFFICE O/P EST HI 40 MIN: CPT | Performed by: NURSE PRACTITIONER

## 2023-07-19 RX ORDER — GLIPIZIDE 5 MG/1
5 TABLET, FILM COATED, EXTENDED RELEASE ORAL DAILY
Qty: 90 TABLET | Refills: 0 | Status: SHIPPED | OUTPATIENT
Start: 2023-07-19

## 2023-07-19 RX ORDER — BLOOD SUGAR DIAGNOSTIC
STRIP MISCELLANEOUS
Qty: 300 STRIP | Refills: 1 | Status: SHIPPED | OUTPATIENT
Start: 2023-07-19

## 2023-07-19 RX ORDER — PREDNISONE 20 MG/1
40 TABLET ORAL DAILY
COMMUNITY
Start: 2023-04-30 | End: 2023-07-19 | Stop reason: ALTCHOICE

## 2023-07-20 DIAGNOSIS — I10 ESSENTIAL HYPERTENSION: ICD-10-CM

## 2023-07-20 RX ORDER — LISINOPRIL 40 MG/1
40 TABLET ORAL DAILY
Qty: 90 TABLET | Refills: 1 | OUTPATIENT
Start: 2023-07-20

## 2023-07-20 RX ORDER — LISINOPRIL 40 MG/1
40 TABLET ORAL DAILY
Qty: 30 TABLET | Refills: 0 | Status: SHIPPED | OUTPATIENT
Start: 2023-07-20

## 2023-07-20 NOTE — TELEPHONE ENCOUNTER
Requested Prescriptions     Pending Prescriptions Disp Refills    LISINOPRIL 40 MG Oral Tab [Pharmacy Med Name: LISINOPRIL 40MG TABLETS] 90 tablet 1     Sig: TAKE 1 TABLET(40 MG) BY MOUTH DAILY       LOV: 1/17/23 CS    LAST PHYSICAL: 1/17/23 CS    LAST REFILL: lisinopril-90-1/17/23    LAST LAB: 4/8/22    Your Appointments      Wednesday August 16, 2023  7:30 AM  [facility]) [address] [place] [address] [place] [address] [phone]   You have been scheduled for a Virtual Telephone visit with your provider. Please be available at your phone so that your physician can contact you, and be prepared with any questions or concerns. You may be billed a copay at a later time, depending upon your insurance. As always, your health is our priority.

## 2023-07-21 ENCOUNTER — TELEPHONE (OUTPATIENT)
Dept: ENDOCRINOLOGY | Facility: HOSPITAL | Age: 48
End: 2023-07-21

## 2023-07-26 NOTE — TELEPHONE ENCOUNTER
Mailbox full    Mailed letter home. If no appointment scheduled by 8/9 forward to [phone].

## 2023-08-11 DIAGNOSIS — I10 ESSENTIAL HYPERTENSION: ICD-10-CM

## 2023-08-11 NOTE — TELEPHONE ENCOUNTER
Requested Prescriptions     Pending Prescriptions Disp Refills    AMLODIPINE 5 MG Oral Tab [Pharmacy Med Name: AMLODIPINE BESYLATE 5MG TABLETS] 90 tablet 0     Sig: TAKE 1 TABLET(5 MG) BY MOUTH DAILY    HYDROCHLOROTHIAZIDE 12.5 MG Oral Cap [Pharmacy Med Name: HYDROCHLOROTHIAZIDE 12.5MG CAPSULES] 30 capsule 0     Sig: TAKE 1 CAPSULE(12.5 MG) BY MOUTH DAILY       LOV: 1/17/23 CS    LAST PHYSICAL: 3/24/21 CB    LAST REFILL: amlodipine-30-7/14/23  Hydrochlorothiazide-30-7/14/23    LAST LAB: 4/8/22    Your Appointments      Wednesday August 16, 2023  7:30 AM  [facility] [address] [place] [address] [place] [address] [phone]   You have been scheduled for a Virtual Telephone visit with your provider. Please be available at your phone so that your physician can contact you, and be prepared with any questions or concerns. You may be billed a copay at a later time, depending upon your insurance. As always, your health is our priority.

## 2023-08-11 NOTE — TELEPHONE ENCOUNTER
Future Appointments   Date Time Provider Guille Ellen   8/16/2023  7:30 AM JAIME Winston [facility]     Multiple care gaps and overdue for labs and cpe. Needs to schedule and then will fill temporary supply.

## 2023-08-14 NOTE — TELEPHONE ENCOUNTER
"Called pt to check on postop status.  Pt responses below:    Pt's phone went straight to .  Requested pt see  msg sent yesterday and get back to clinic as well as call to discuss how pt is doing.   Kimberly Mahan, MS, RD, RN      Surgery Date: 4/26/23  Surgery Type: gastric sleeve  Surgeon: Dr. Martinez    Fluid Intake:  64 of water and 1/2 Ensure Max and protein jello - will also start bone broth today    Pain Assessment:    Pain level: 7.5/10  Location: all over abdomen - not even where the stomach incisions.   When did it start: PO  Is it constant?  With movement   What makes it better or worse? Movement    Can't take oxycodone only tood 3 of 15 that received and can't take Dilaudid which makes her feel  worse than oxycodone  Taking Tyl 500 mg 3 times daily 5 at at time = 7500 mg daily  Advised to take no > 3000 mg daily.   Pt remembers toradol was good but knows cant take.  Is okay with only something less than the oxy and dilaudid.   Is wondering if Tramadol is an option.     Was in the hospital longer so had to spend a day without help at home so is a bit sore from having to reach and bend.    Is checking vitals at home:  /84  96-99 SAO2    Explained why bruising left  8x8\" LLQ side biggest  Lots of spots of bruising on right. 3 inches to right of belly button is where most are.   Is from blood thinners but if larger LLQ bruise gets bigger, warm, painful, hard to let us know or fever too.     Reports some pills sit where bottom of sternum is but know this is NL.  Is taking pred pack for PO swelling.      Medications:    START taking these medications     Details   acetaminophen (TYLENOL) 325 MG tablet Take 2 tablets (650 mg) by mouth every 6 hours as needed for pain     Associated Diagnoses: Morbid obesity with BMI of 40.0-44.9, adult (H)    taking 500 mg - 5 - 3 times daily = 7500 mg daily   hyoscyamine (LEVSIN/SL) 0.125 MG sublingual tablet Place 1 tablet (125 mcg) under the tongue every 6 hours for 5 " LOV   1-17-23  no appt with PCP scheduled      #30 7-14-23 days  Qty: 20 tablet, Refills: 0     Associated Diagnoses: Morbid obesity with BMI of 40.0-44.9, adult (H)    not taking - not needed per pt   methocarbamol (ROBAXIN) 500 MG tablet Take 1 tablet (500 mg) by mouth every 6 hours for 5 days  Qty: 20 tablet, Refills: 0     Associated Diagnoses: Morbid obesity with BMI of 40.0-44.9, adult (H)    yes - taking   methylPREDNISolone (MEDROL DOSEPAK) 4 MG tablet therapy pack - yes Follow Package Directions  Qty: 21 tablet, Refills: 0     Associated Diagnoses: PONV (postoperative nausea and vomiting)    yes - taking   omeprazole (PRILOSEC) 20 MG DR capsule Take 1 capsule (20 mg) by mouth every morning (before breakfast)  Qty: 90 capsule, Refills: 0     Associated Diagnoses: Morbid obesity with BMI of 40.0-44.9, adult (H)    yes - taking   ondansetron (ZOFRAN ODT) 4 MG ODT tab Take 1 tablet (4 mg) by mouth every 6 hours as needed for nausea or vomiting  Qty: 15 tablet, Refills: 0     Associated Diagnoses: Morbid obesity with BMI of 40.0-44.9, adult (H)    not taking/needed    prochlorperazine (COMPAZINE) 10 MG tablet Take 1 tablet (10 mg) by mouth every 6 hours as needed for nausea or vomiting  Qty: 10 tablet, Refills: 0     Associated Diagnoses: PONV (postoperative nausea and vomiting)    not taking/needed   propranolol (INDERAL) 20 MG tablet Take 1 tablet (20 mg) by mouth 2 times daily for 30 days  Qty: 60 tablet, Refills: 0     Associated Diagnoses: Morbid obesity with BMI of 40.0-44.9, adult (H)    taking twice daily   simethicone (MYLICON) 40 MG/0.6ML suspension Take 0.6 mLs (40 mg) by mouth 4 times daily as needed for cramping or flatulence (gas pain)  Qty: 45 mL, Refills: 0     Associated Diagnoses: Morbid obesity with BMI of 40.0-44.9, adult (H)    states didn't get from hospital and passing gas due to up moving             CONTINUE these medications which have NOT CHANGED     Details   ALPRAZolam (XANAX) 0.25 MG tablet Take 1 tablet (0.25 mg) by mouth daily as needed  for anxiety  Qty: 15 tablet, Refills: 0     Associated Diagnoses: JAXON (generalized anxiety disorder); PTSD (post-traumatic stress disorder)    not needed    Calcium Carb-Cholecalciferol (CALCIUM 600/VITAMIN D) 600-400 MG-UNIT CHEW Take 1 tablet by mouth 2 times daily Take one twice daily and at least 2 hours apart from iron.  Qty: 180 tablet, Refills: 1     Associated Diagnoses: Morbid obesity with BMI of 45.0-49.9, adult (H)       Naphazoline HCl (CLEAR EYES OP) Place 1 drop into both eyes as needed (for dry eye)       rosuvastatin (CRESTOR) 20 MG tablet Take 1 tablet (20 mg) by mouth daily  Qty: 90 tablet, Refills: 3     Comments: To replace atorvastatin  Associated Diagnoses: Hyperlipidemia LDL goal <100    not yet - not taking religiously before surgery and PCP aware   sertraline (ZOLOFT) 100 MG tablet Take 1.5 tablets (150 mg) by mouth daily Office visit required for further refills  Qty: 135 tablet, Refills: 0     Associated Diagnoses: JAXON (generalized anxiety disorder)    yes taking            STOP taking these medications         propranolol ER (INDERAL LA) 80 MG 24 hr capsule Comments:   Reason for Stopping:      RX for PPI?  yes  Do you understand how to take your medications postop?  yes  Reviewed postop med changes:  yes  Have you restarted all of the medications that weren't changed after surgery?  Not Crestor D  Do you have any questions about how to take your medications?  no    Anti-Coagulation: NA    Diet: clear to full liquids  How tolerated? Well  Now that looks at timeline - can transition to full liquids  Referred pt to p 12-13 of AVS   Cautioned of split pea soup due to gas.    Blood Glucose Monitoring: NA    Nausea: none    Vomiting: none    NSAIDs: no  Smoking: no     Fever: no    Incisions: wears band - still have steri strips but one at top of sternum fell off per pt.     BMs:  Last BM: one after surgery with suppository while in hospital  Discussed okay to use Prune juice warm on empty  stomach or apple juice  Color/consistency: NA  Flatus: passing   Bloating/cramping:  Walking helps - doesn't have the simethicone at home and no needs.  Informed pt it is normal to have no stool for up to a week if passing gas and not having bloating/cramping.    Urinary: non    Sleeping/Rest: doesn't wake up from sleep due to pain    Activity: up every other hour walking helps with gas  Are you up at least every hour or two and taking a short walk?    Activity caution:  Advised pt to be careful to avoid straining abdominal muscles during recovery.    Incentive Spirometer: yes up 2000    CPAP: is back to using it and likes it.     Mood: taking Zoloft/Xanax: - doing okay - had 48 hours - day 3 and day 4 cried for 2 days was miserable due to surgery.Will contact Dr. Phillips if mood not to her liking.     Other symptoms (CP/SOB/dizzy/rapid HR): none    Plan/Advice:   Advised pt to contact PCP and schedule hospital follow up visit within 1 week of discharge. = Done yesterday and PCP stated to reviewed  propranolol you dose 20 mg twice daily in the chart.  Hold if dizzy or lightheaded or heart rate less than 60 bpm.    Discuss medications or recommendations from discharging provider.    1 week followup appointment verified. Pt will MC when receives confirmation which day works.     Use of incentive spirometer reinforced.    Call clinic with any questions or concerns.  Reviewed that clinic staff are available on call during nights and weekends for postoperative concerns.  Number given.    No further questions or concerns now. Pt agrees to call if having any questions or concerns.  Kimberly Mahan, MS, RD, RN

## 2023-08-15 ENCOUNTER — PATIENT MESSAGE (OUTPATIENT)
Dept: ENDOCRINOLOGY CLINIC | Facility: CLINIC | Age: 48
End: 2023-08-15

## 2023-08-15 NOTE — TELEPHONE ENCOUNTER
PSR's please call patient to schedule visit as per requested below - we can consider refills when that is scheduled to hold over

## 2023-08-16 ENCOUNTER — VIRTUAL PHONE E/M (OUTPATIENT)
Dept: ENDOCRINOLOGY CLINIC | Facility: CLINIC | Age: 48
End: 2023-08-16
Payer: MEDICAID

## 2023-08-16 RX ORDER — AMLODIPINE BESYLATE 5 MG/1
5 TABLET ORAL DAILY
Qty: 15 TABLET | Refills: 0 | Status: SHIPPED | OUTPATIENT
Start: 2023-08-16

## 2023-08-16 NOTE — PROGRESS NOTES
Ttempted to contact pt 2x as well as his spouse for tele visit today and no answer. Pt's phone went straight to voicemail and mail box is full and vm left with spouse that pt will need to call and reschedule his appt.

## 2023-08-16 NOTE — TELEPHONE ENCOUNTER
Request for refill again of the Amlodipine from [address] to 15 tabs? I attempted to call patient and VM box is full    I did call wife ok per consent and asked that she please have him call us to schedule visit, advised unable to keep filling meds if he does not come in. Wife fantasma will talk to him and ask him to call us to schedule. Pended for 15 tabs if agreeable.

## 2023-08-24 NOTE — TELEPHONE ENCOUNTER
Mailbox Full    Letter mailed to patient. If no appointment scheduled by 9/7/23 forward to [phone]

## 2023-09-19 ENCOUNTER — TELEPHONE (OUTPATIENT)
Dept: INTERNAL MEDICINE CLINIC | Facility: CLINIC | Age: 48
End: 2023-09-19

## 2023-09-19 NOTE — TELEPHONE ENCOUNTER
Janiet message from 9/6/23 NOT READ. Call pt to schedule OV. Looks like he saw Oskar May last and may choose to do so again, up to him.

## 2023-09-20 NOTE — TELEPHONE ENCOUNTER
Sending non-compliance bcbs community warning letter regular and certified mail. [id number] 1 1/17/23,  3 mo CPE, DM, Bp  2/17/23 mcm  7/17/23 lm  7/20/23 lm, mcm  7/26/23 vm full, letter  8/11/23 mcm  8/15/23 vm full, mcm  8/18/23 lm  8/23/23 vm full  8/24/23 vm full, letter  9/6/23 vm full, mcm  9/12/23 mcm    Sending copy to scanning.

## 2023-09-21 NOTE — TELEPHONE ENCOUNTER
Called and verified patient's full name and date of birth. Informed patient of lab results, per Dr. José Miguel Rashid and patient verbalized understanding and states that he would need a refill of Atorvastatin sent to his mail order pharmacy. Warning letter sent. See TE 9/19/23.

## 2023-09-22 RX ORDER — HYDROCHLOROTHIAZIDE 12.5 MG/1
12.5 CAPSULE, GELATIN COATED ORAL DAILY
Qty: 30 CAPSULE | Refills: 0 | OUTPATIENT
Start: 2023-09-22

## 2023-09-22 RX ORDER — AMLODIPINE BESYLATE 5 MG/1
5 TABLET ORAL DAILY
Qty: 90 TABLET | Refills: 0 | OUTPATIENT
Start: 2023-09-22

## 2023-10-03 NOTE — LETTER
03/17/20        [address]  [address] [address]      Dear Solomon Ochoa,    [id number] Merged with [facility] records indicate that you have outstanding lab work and or testing that was ordered for you and has not yet been completed:  Orders Placed This Encounter [facility] Pregnancy And Lactation Text: This medication has not been assigned a Pregnancy Risk Category. It is unknown if the medication is excreted in breast milk.

## 2023-11-02 DIAGNOSIS — E11.65 TYPE 2 DIABETES MELLITUS WITH HYPERGLYCEMIA, WITHOUT LONG-TERM CURRENT USE OF INSULIN (HCC): ICD-10-CM

## 2023-11-02 NOTE — TELEPHONE ENCOUNTER
Requested Prescriptions     Pending Prescriptions Disp Refills    GLIPIZIDE ER 5 MG Oral Tablet 24 Hr [Pharmacy Med Name: GLIPIZIDE ER 5MG TABLETS] 90 tablet 0     Sig: TAKE 1 TABLET(5 MG) BY MOUTH DAILY       Last A1c value was 8.7% done 4/6/2022.    Refill 7/19/23  LOV 7/19/23 - per refill 8/22 needs FU and labs  No FU

## 2023-11-13 NOTE — TELEPHONE ENCOUNTER
LVMTCB patient hasn't been seen since 7/19/23 No showed 8/16/23 please refuse patient states he will call back and doesn't third attempt

## 2023-11-14 RX ORDER — GLIPIZIDE 5 MG/1
5 TABLET, FILM COATED, EXTENDED RELEASE ORAL DAILY
Qty: 90 TABLET | Refills: 0 | OUTPATIENT
Start: 2023-11-14

## 2023-11-29 ENCOUNTER — TELEPHONE (OUTPATIENT)
Dept: ENDOCRINOLOGY CLINIC | Facility: CLINIC | Age: 48
End: 2023-11-29

## 2023-11-29 NOTE — TELEPHONE ENCOUNTER
Warning letter sent to pt via [employer]. If not viewed in 3 days please mail certified letter and monitor for receipt of letter by pt. PCP office is moving to dismiss pt for non-compliance as well.

## 2023-12-28 DIAGNOSIS — E11.65 TYPE 2 DIABETES MELLITUS WITH HYPERGLYCEMIA, WITHOUT LONG-TERM CURRENT USE OF INSULIN (HCC): ICD-10-CM

## 2023-12-28 DIAGNOSIS — E11.21 TYPE 2 DIABETES MELLITUS WITH DIABETIC NEPHROPATHY, WITHOUT LONG-TERM CURRENT USE OF INSULIN (HCC): ICD-10-CM

## 2023-12-28 DIAGNOSIS — I10 ESSENTIAL HYPERTENSION: ICD-10-CM

## 2023-12-28 RX ORDER — GLIPIZIDE 5 MG/1
5 TABLET, FILM COATED, EXTENDED RELEASE ORAL DAILY
Qty: 30 TABLET | Refills: 0 | Status: SHIPPED | OUTPATIENT
Start: 2023-12-28

## 2023-12-28 NOTE — TELEPHONE ENCOUNTER
Will give 30 days only - pt had been issued a warning letter. If he misses his appointment will be dismissed and referred back to PCP for care.

## 2023-12-28 NOTE — TELEPHONE ENCOUNTER
Pt was transferred from , requesting refills on farxiga and glipizide. Future Appointments   Date Time Provider [facility]   1/4/2024 10:00 AM JAIME Leggett [facility]   1/25/2024  9:40 AM Tess Don PA-C [facility]   Last A1c value was 8.7% done 4/6/2022. Okay to refill?

## 2023-12-30 NOTE — TELEPHONE ENCOUNTER
Last VISIT - 1/17/23 f/u for DM and HTN     Last CPE - No recent physical     Last REFILL -     amLODIPine 5 MG Oral Tab 15 tablet 0 8/16/2023     hydroCHLOROthiazide 12.5 MG Oral Cap 30 capsule 0 7/14/2023     metoprolol succinate  MG Oral Tablet 24 Hr 90 tablet 1 1/17/2023     Last LABS - 1/17/23 A1c, lipid, cmp 4/6/22 cbc and tsh    No future appointments. Per PROTOCOL? Failed     Please Approve or Deny.

## 2023-12-31 DIAGNOSIS — I10 ESSENTIAL HYPERTENSION: ICD-10-CM

## 2023-12-31 RX ORDER — METOPROLOL TARTRATE 100 MG/1
100 TABLET ORAL DAILY
Qty: 30 TABLET | Refills: 0 | Status: SHIPPED | OUTPATIENT
Start: 2023-12-31

## 2023-12-31 RX ORDER — HYDROCHLOROTHIAZIDE 12.5 MG/1
12.5 CAPSULE, GELATIN COATED ORAL DAILY
Qty: 30 CAPSULE | Refills: 0 | Status: SHIPPED | OUTPATIENT
Start: 2023-12-31

## 2023-12-31 RX ORDER — AMLODIPINE BESYLATE 5 MG/1
5 TABLET ORAL DAILY
Qty: 30 TABLET | Refills: 0 | Status: SHIPPED | OUTPATIENT
Start: 2023-12-31

## 2024-01-01 RX ORDER — METOPROLOL TARTRATE 100 MG/1
100 TABLET ORAL DAILY
Qty: 90 TABLET | Refills: 0 | OUTPATIENT
Start: 2024-01-01

## 2024-01-01 RX ORDER — AMLODIPINE BESYLATE 5 MG/1
5 TABLET ORAL DAILY
Qty: 90 TABLET | Refills: 0 | OUTPATIENT
Start: 2024-01-01

## 2024-01-01 RX ORDER — HYDROCHLOROTHIAZIDE 12.5 MG/1
12.5 CAPSULE, GELATIN COATED ORAL DAILY
Qty: 90 CAPSULE | Refills: 0 | OUTPATIENT
Start: 2024-01-01

## 2024-01-02 NOTE — TELEPHONE ENCOUNTER
Barby Guadalupe is a 6mo old (ex. 26+3 weeker, corrected to ~3 mo. age), who has a complicated PMHx including congenital heart block s/p pacemaker placement (August 2021) and subsequent persistent pericardial effusions.  She was transferred from the NICU prior to planned hemodynamic cath. Additionally has pulmonary hypertension and chronic lung disease of prematurity with progressive acute on chronic hypoxic respiratory failure requiring escalation of her respiratory support to NIMV, requiring 100% FiO2 to maintain her saturations 85-92%. Managed on budesonide, sildenafil, lasix, and dexamethasone. Dexamethasone restarted 11/28, unclear if this was for pericardial effusion or worsening respiratory disease, but the NICU daily progress note mentioned concern that her respiratory status worsened after discontinuing the dexamethasone and treated with a course of prednisone without improvement. that she was also treated with a course of prednisone with no benefit. ENT consulted for trach.    Vent settings switched from ventilator support to pressure support today; PEEP 10. Continually desats with attempts to wean vent. Currently on 60% FiO2.   Duplicate request

## 2024-01-04 ENCOUNTER — TELEPHONE (OUTPATIENT)
Dept: ENDOCRINOLOGY CLINIC | Facility: CLINIC | Age: 49
End: 2024-01-04

## 2024-01-04 NOTE — TELEPHONE ENCOUNTER
Pt no showed appt with KR today, previously sent warning letter.  pt will need to establish w endo or FU with PCP. Pended dismissal for KR to approve

## 2024-01-21 DIAGNOSIS — I10 ESSENTIAL HYPERTENSION: ICD-10-CM

## 2024-01-21 DIAGNOSIS — E11.65 TYPE 2 DIABETES MELLITUS WITH HYPERGLYCEMIA, WITHOUT LONG-TERM CURRENT USE OF INSULIN (HCC): ICD-10-CM

## 2024-01-22 RX ORDER — GLIPIZIDE 5 MG/1
5 TABLET, FILM COATED, EXTENDED RELEASE ORAL DAILY
Qty: 30 TABLET | Refills: 0 | OUTPATIENT
Start: 2024-01-22

## 2024-01-22 NOTE — TELEPHONE ENCOUNTER
Last VISIT 01-17-23 CS hypertension     Last CPE over a year ago     Last REFILL  Atorvastatin 20 MG 01-17-23   Metoprolol tartrate 100 MG 12-31-23  Amlodipine 5 MG 12-31-23  Hydrochlorothiazide 12.5 MG 12-31-23    Last LABS over a year    Future Appointments   Date Time Provider Department Center   1/25/2024  9:40 AM Amelia Guevara PA-C [facility]         Per PROTOCOL? No    Please Approve or Deny.

## 2024-01-22 NOTE — TELEPHONE ENCOUNTER
Requested Prescriptions     Pending Prescriptions Disp Refills    GLIPIZIDE ER 5 MG Oral Tablet 24 Hr [Pharmacy Med Name: GLIPIZIDE ER 5MG TABLETS] 30 tablet 0     Sig: TAKE 1 TABLET(5 MG) BY MOUTH DAILY     Your appointments       Date & Time Appointment Department ([place])    Jan 25, 2024  9:40 AM CST Follow up - Extended with Amelia Guevara PA-C [address] ([facility])              [address]  [facility] [address] [place] [address]/[facility]  [address] [place] [id number]  [phone]          HGBA1C:    Lab Results   Component Value Date    A1C 8.7 (H) 04/06/2022    A1C 8.3 (H) 03/19/2021    A1C 8.2 (H) 12/09/2020     (H) 04/06/2022     LOV: 8-16-23    REFILL: 12-28-23

## 2024-01-24 RX ORDER — ATORVASTATIN CALCIUM 20 MG/1
20 TABLET, FILM COATED ORAL NIGHTLY
Qty: 30 TABLET | Refills: 0 | Status: SHIPPED | OUTPATIENT
Start: 2024-01-24

## 2024-01-24 RX ORDER — AMLODIPINE BESYLATE 5 MG/1
5 TABLET ORAL DAILY
Qty: 30 TABLET | Refills: 0 | Status: SHIPPED | OUTPATIENT
Start: 2024-01-24

## 2024-01-24 RX ORDER — HYDROCHLOROTHIAZIDE 12.5 MG/1
12.5 CAPSULE, GELATIN COATED ORAL DAILY
Qty: 30 CAPSULE | Refills: 0 | Status: SHIPPED | OUTPATIENT
Start: 2024-01-24

## 2024-01-24 RX ORDER — METOPROLOL TARTRATE 100 MG/1
100 TABLET ORAL DAILY
Qty: 30 TABLET | Refills: 0 | Status: SHIPPED | OUTPATIENT
Start: 2024-01-24

## 2024-01-25 DIAGNOSIS — I10 ESSENTIAL HYPERTENSION: ICD-10-CM

## 2024-01-26 DIAGNOSIS — I10 ESSENTIAL HYPERTENSION: ICD-10-CM

## 2024-01-26 DIAGNOSIS — E11.21 TYPE 2 DIABETES MELLITUS WITH DIABETIC NEPHROPATHY, WITHOUT LONG-TERM CURRENT USE OF INSULIN (HCC): Primary | ICD-10-CM

## 2024-01-26 RX ORDER — HYDROCHLOROTHIAZIDE 12.5 MG/1
12.5 CAPSULE, GELATIN COATED ORAL DAILY
Qty: 90 CAPSULE | Refills: 0 | OUTPATIENT
Start: 2024-01-26

## 2024-01-26 RX ORDER — ATORVASTATIN CALCIUM 20 MG/1
20 TABLET, FILM COATED ORAL NIGHTLY
Qty: 90 TABLET | Refills: 0 | OUTPATIENT
Start: 2024-01-26

## 2024-01-26 RX ORDER — AMLODIPINE BESYLATE 5 MG/1
5 TABLET ORAL DAILY
Qty: 90 TABLET | Refills: 0 | OUTPATIENT
Start: 2024-01-26

## 2024-01-26 RX ORDER — METOPROLOL TARTRATE 100 MG/1
100 TABLET ORAL DAILY
Qty: 90 TABLET | Refills: 0 | OUTPATIENT
Start: 2024-01-26

## 2024-01-27 NOTE — TELEPHONE ENCOUNTER
Last OV: Hypertension 01/17/23 CS    Future Appointments   Date Time Provider Department Center   2/5/2024  9:00 AM Amelia Guevara PA-C [facility]        Latest labs:   BMP 05/06/22    Latest RX:   Medication Quantity Refills Start End   LISINOPRIL 40 MG Oral Tab 30 tablet 0 7/20/2023 --   Sig:   TAKE 1 TABLET(40 MG) BY MOUTH DAILY       Per protocol,    Hypertension Medications Protocol [id number]/26/2024 02:48 PM   Protocol Details CMP or BMP in past 12 months    Last serum creatinine< 2.0    Appointment in past 6 or next 3 months

## 2024-01-29 RX ORDER — LISINOPRIL 40 MG/1
40 TABLET ORAL DAILY
Qty: 30 TABLET | Refills: 0 | Status: SHIPPED | OUTPATIENT
Start: 2024-01-29

## 2024-01-29 NOTE — TELEPHONE ENCOUNTER
I went ahead and refilled but last saw CS and will be seeing CS.  Will need labs done prior to his visit with CS.  Last labs were over a year ago.  Will not be able to continue to refill until labs done.

## 2024-01-29 NOTE — TELEPHONE ENCOUNTER
Labs have . New labs entered. I see patient was dismissed from DM clinic. Last A1c 8.7    DM labs ordered via protocol. Scheduled with  2023 for BP and DM follow up.     Called patient cell phone and went straight to  with no option to leave message as VM has not been set up. Per verbal consent wife listed as general and sensitive information to be shared. Holly notified of labs needed prior to upcoming appointment and that we are unable to reach him. She states she will inform Willie of this information and to call us to discuss if any questions.     Per previous TE non compliance bcbs community warning letter was sent 2023 from our office as well.

## 2024-02-01 NOTE — TELEPHONE ENCOUNTER
Future Appointments   Date Time Provider Department Center   2/5/2024  9:00 AM Amelia Guevara PA-C [facility]     Noted.

## 2024-02-02 ENCOUNTER — LAB ENCOUNTER (OUTPATIENT)
Dept: LAB | Age: 49
End: 2024-02-02
Attending: PHYSICIAN ASSISTANT
Payer: MEDICAID

## 2024-02-02 LAB
ALBUMIN SERPL-MCNC: 3.6 G/DL (ref 3.4–5)
ALBUMIN/GLOB SERPL: 0.9 {RATIO} (ref 1–2)
ALP LIVER SERPL-CCNC: 109 U/L
ALT SERPL-CCNC: 20 U/L
ANION GAP SERPL CALC-SCNC: 6 MMOL/L (ref 0–18)
AST SERPL-CCNC: 22 U/L (ref 15–37)
BASOPHILS # BLD AUTO: 0.05 X10(3) UL (ref 0–0.2)
BASOPHILS NFR BLD AUTO: 0.9 %
BILIRUB SERPL-MCNC: 1.1 MG/DL (ref 0.1–2)
BUN BLD-MCNC: 17 MG/DL (ref 9–23)
CALCIUM BLD-MCNC: 9.3 MG/DL (ref 8.5–10.1)
CHLORIDE SERPL-SCNC: 106 MMOL/L (ref 98–112)
CHOLEST SERPL-MCNC: 197 MG/DL (ref ?–200)
CO2 SERPL-SCNC: 26 MMOL/L (ref 21–32)
CREAT BLD-MCNC: 1.3 MG/DL
CREAT UR-SCNC: 86 MG/DL
EGFRCR SERPLBLD CKD-EPI 2021: 68 ML/MIN/1.73M2 (ref 60–?)
EOSINOPHIL # BLD AUTO: 0.15 X10(3) UL (ref 0–0.7)
EOSINOPHIL NFR BLD AUTO: 2.7 %
ERYTHROCYTE [DISTWIDTH] IN BLOOD BY AUTOMATED COUNT: 12.5 %
EST. AVERAGE GLUCOSE BLD GHB EST-MCNC: 197 MG/DL (ref 68–126)
FASTING PATIENT LIPID ANSWER: YES
FASTING STATUS PATIENT QL REPORTED: YES
GLOBULIN PLAS-MCNC: 3.8 G/DL (ref 2.8–4.4)
GLUCOSE BLD-MCNC: 99 MG/DL (ref 70–99)
HBA1C MFR BLD: 8.5 % (ref ?–5.7)
HCT VFR BLD AUTO: 47.4 %
HDLC SERPL-MCNC: 35 MG/DL (ref 40–59)
HGB BLD-MCNC: 16.3 G/DL
IMM GRANULOCYTES # BLD AUTO: 0.02 X10(3) UL (ref 0–1)
IMM GRANULOCYTES NFR BLD: 0.4 %
LDLC SERPL CALC-MCNC: 134 MG/DL (ref ?–100)
LYMPHOCYTES # BLD AUTO: 1.81 X10(3) UL (ref 1–4)
LYMPHOCYTES NFR BLD AUTO: 32.6 %
MCH RBC QN AUTO: 29.9 PG (ref 26–34)
MCHC RBC AUTO-ENTMCNC: 34.4 G/DL (ref 31–37)
MCV RBC AUTO: 86.8 FL
MICROALBUMIN UR-MCNC: 99.1 MG/DL
MICROALBUMIN/CREAT 24H UR-RTO: 1152.3 UG/MG (ref ?–30)
MONOCYTES # BLD AUTO: 0.53 X10(3) UL (ref 0.1–1)
MONOCYTES NFR BLD AUTO: 9.5 %
NEUTROPHILS # BLD AUTO: 3 X10 (3) UL (ref 1.5–7.7)
NEUTROPHILS # BLD AUTO: 3 X10(3) UL (ref 1.5–7.7)
NEUTROPHILS NFR BLD AUTO: 53.9 %
NONHDLC SERPL-MCNC: 162 MG/DL (ref ?–130)
OSMOLALITY SERPL CALC.SUM OF ELEC: 288 MOSM/KG (ref 275–295)
PLATELET # BLD AUTO: 252 10(3)UL (ref 150–450)
POTASSIUM SERPL-SCNC: 4.9 MMOL/L (ref 3.5–5.1)
PROT SERPL-MCNC: 7.4 G/DL (ref 6.4–8.2)
RBC # BLD AUTO: 5.46 X10(6)UL
SODIUM SERPL-SCNC: 138 MMOL/L (ref 136–145)
TRIGL SERPL-MCNC: 154 MG/DL (ref 30–149)
VLDLC SERPL CALC-MCNC: 28 MG/DL (ref 0–30)
WBC # BLD AUTO: 5.6 X10(3) UL (ref 4–11)

## 2024-02-02 PROCEDURE — 82570 ASSAY OF URINE CREATININE: CPT | Performed by: NURSE PRACTITIONER

## 2024-02-02 PROCEDURE — 80061 LIPID PANEL: CPT | Performed by: PHYSICIAN ASSISTANT

## 2024-02-02 PROCEDURE — 80053 COMPREHEN METABOLIC PANEL: CPT | Performed by: PHYSICIAN ASSISTANT

## 2024-02-02 PROCEDURE — 83036 HEMOGLOBIN GLYCOSYLATED A1C: CPT | Performed by: PHYSICIAN ASSISTANT

## 2024-02-02 PROCEDURE — 85025 COMPLETE CBC W/AUTO DIFF WBC: CPT | Performed by: PHYSICIAN ASSISTANT

## 2024-02-02 PROCEDURE — 36415 COLL VENOUS BLD VENIPUNCTURE: CPT | Performed by: PHYSICIAN ASSISTANT

## 2024-02-02 PROCEDURE — 82043 UR ALBUMIN QUANTITATIVE: CPT | Performed by: NURSE PRACTITIONER

## 2024-02-05 ENCOUNTER — OFFICE VISIT (OUTPATIENT)
Dept: INTERNAL MEDICINE CLINIC | Facility: CLINIC | Age: 49
End: 2024-02-05
Payer: MEDICAID

## 2024-02-05 VITALS
RESPIRATION RATE: 18 BRPM | BODY MASS INDEX: 38.31 KG/M2 | DIASTOLIC BLOOD PRESSURE: 82 MMHG | OXYGEN SATURATION: 97 % | SYSTOLIC BLOOD PRESSURE: 142 MMHG | HEART RATE: 63 BPM | TEMPERATURE: 98 F | WEIGHT: 282.81 LBS | HEIGHT: 72 IN

## 2024-02-05 DIAGNOSIS — E11.21 TYPE 2 DIABETES MELLITUS WITH DIABETIC NEPHROPATHY, WITHOUT LONG-TERM CURRENT USE OF INSULIN (HCC): Primary | ICD-10-CM

## 2024-02-05 DIAGNOSIS — I10 ESSENTIAL HYPERTENSION: ICD-10-CM

## 2024-02-05 DIAGNOSIS — Z12.11 SCREEN FOR COLON CANCER: ICD-10-CM

## 2024-02-05 DIAGNOSIS — L60.3 NAIL DYSTROPHY: ICD-10-CM

## 2024-02-05 DIAGNOSIS — E78.5 HYPERLIPIDEMIA LDL GOAL <100: ICD-10-CM

## 2024-02-05 DIAGNOSIS — E11.3513 TYPE 2 DIABETES MELLITUS WITH BOTH EYES AFFECTED BY PROLIFERATIVE RETINOPATHY AND MACULAR EDEMA, WITHOUT LONG-TERM CURRENT USE OF INSULIN (HCC): ICD-10-CM

## 2024-02-05 DIAGNOSIS — E11.65 TYPE 2 DIABETES MELLITUS WITH HYPERGLYCEMIA, WITHOUT LONG-TERM CURRENT USE OF INSULIN (HCC): ICD-10-CM

## 2024-02-05 PROCEDURE — 99214 OFFICE O/P EST MOD 30 MIN: CPT | Performed by: PHYSICIAN ASSISTANT

## 2024-02-05 RX ORDER — CYCLOBENZAPRINE HCL 10 MG
10 TABLET ORAL NIGHTLY
COMMUNITY
Start: 2023-11-19

## 2024-02-05 RX ORDER — DICLOFENAC SODIUM 75 MG/1
75 TABLET, DELAYED RELEASE ORAL 2 TIMES DAILY
COMMUNITY
Start: 2023-12-02
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
## 2024-02-06 DIAGNOSIS — E11.21 TYPE 2 DIABETES MELLITUS WITH DIABETIC NEPHROPATHY, WITHOUT LONG-TERM CURRENT USE OF INSULIN (HCC): ICD-10-CM

## 2024-02-06 NOTE — TELEPHONE ENCOUNTER
Pt states refill was denied at [facility]. He spoke with CS yesterday needing this medication. Please advise        [id number] - [place] - [facility] [address] AT [address], [phone], [phone] [[id number]]           dapagliflozin (FARXIGA) 10 MG Oral Tab30 scetnh865/28/2023--Sig:   Take 1 tablet (10 mg total) by mouth daily.Route:   OralOrder #:   [id number]

## 2024-02-07 RX ORDER — GLIPIZIDE 5 MG/1
5 TABLET, FILM COATED, EXTENDED RELEASE ORAL DAILY
Qty: 90 TABLET | Refills: 0 | Status: SHIPPED | OUTPATIENT
Start: 2024-02-07

## 2024-02-20 DIAGNOSIS — I10 ESSENTIAL HYPERTENSION: ICD-10-CM

## 2024-02-20 RX ORDER — ATORVASTATIN CALCIUM 20 MG/1
20 TABLET, FILM COATED ORAL NIGHTLY
Qty: 30 TABLET | Refills: 0 | Status: SHIPPED | OUTPATIENT
Start: 2024-02-20 | End: 2024-02-21

## 2024-02-20 NOTE — TELEPHONE ENCOUNTER
Last VISIT:02/05/2024 MARIAJOSE PENA    Last CPE:03/24/2021 MARIAJOSE ALEJANDRE    Last REFILL:01/24/2024   Medication Quantity Refills Start End   AMLODIPINE 5 MG Oral Tab 30 tablet 0       Medication Quantity Refills Start End   atorvastatin 20 MG Oral Tab 30 tablet 0       Medication Quantity Refills Start End   HYDROCHLOROTHIAZIDE 12.5 MG Oral Cap 30 capsule 0       Medication Quantity Refills Start End   METOPROLOL TARTRATE 100 MG Oral Tab 30 tablet 0         Last LABS:02/02/2024    Future Appointments   Date Time Provider Department Center   5/7/2024  8:00 AM Amelia Guevara PA-C [facility]         Per PROTOCOL? Non Protocol    Please Approve or Deny.

## 2024-02-21 RX ORDER — ATORVASTATIN CALCIUM 20 MG/1
20 TABLET, FILM COATED ORAL NIGHTLY
Qty: 90 TABLET | Refills: 0 | Status: SHIPPED | OUTPATIENT
Start: 2024-02-21

## 2024-02-21 NOTE — TELEPHONE ENCOUNTER
02/20/2024   Medication Quantity Refills Start End   ATORVASTATIN 20 MG Oral Tab 30 tablet 0     Duplicate Request

## 2024-02-23 RX ORDER — AMLODIPINE BESYLATE 5 MG/1
5 TABLET ORAL DAILY
Qty: 90 TABLET | Refills: 1 | Status: SHIPPED | OUTPATIENT
Start: 2024-02-23 | End: 2024-05-23

## 2024-02-23 RX ORDER — METOPROLOL TARTRATE 100 MG/1
100 TABLET ORAL DAILY
Qty: 90 TABLET | Refills: 1 | Status: SHIPPED | OUTPATIENT
Start: 2024-02-23

## 2024-02-23 RX ORDER — HYDROCHLOROTHIAZIDE 12.5 MG/1
12.5 CAPSULE, GELATIN COATED ORAL DAILY
Qty: 90 CAPSULE | Refills: 1 | Status: SHIPPED | OUTPATIENT
Start: 2024-02-23 | End: 2024-05-23

## 2024-03-05 DIAGNOSIS — I10 ESSENTIAL HYPERTENSION: ICD-10-CM

## 2024-03-05 RX ORDER — LISINOPRIL 40 MG/1
40 TABLET ORAL DAILY
Qty: 90 TABLET | Refills: 0 | Status: SHIPPED | OUTPATIENT
Start: 2024-03-05

## 2024-03-28 RX ORDER — ATORVASTATIN CALCIUM 20 MG/1
20 TABLET, FILM COATED ORAL NIGHTLY
Qty: 90 TABLET | Refills: 0 | Status: SHIPPED | OUTPATIENT
Start: 2024-03-28

## 2024-03-28 NOTE — TELEPHONE ENCOUNTER
LOV with CS 2/5/24. Due for f/u in May, scheduled on 5/7/24.     Name from pharmacy: ATORVASTATIN 20MG TABLETS          Will file in chart as: ATORVASTATIN 20 MG Oral Tab    Sig: TAKE 1 TABLET(20 MG) BY MOUTH EVERY NIGHT    Disp: 90 tablet    Refills: 0 (Pharmacy requested: Not specified)    Start: 3/28/2024    Class: Normal    Non-formulary    Last ordered: 1 month ago (2/21/2024) by Amelia Guevara PA-C    Last refill: 2/21/2024    Rx #: [id number]    Cholesterol Medication Protocol [id number]/28/2024 09:29 AM   Protocol Details ALT < 80    ALT resulted within past year    Lipid panel within past 12 months    In person appointment or virtual visit in the past 12 mos or appointment in next 3 mos      To be filled at: [id number] [place] [address] AT [address], [phone], [phone]

## 2024-05-08 DIAGNOSIS — E11.21 TYPE 2 DIABETES MELLITUS WITH DIABETIC NEPHROPATHY, WITHOUT LONG-TERM CURRENT USE OF INSULIN (HCC): ICD-10-CM

## 2024-05-08 DIAGNOSIS — E11.65 TYPE 2 DIABETES MELLITUS WITH HYPERGLYCEMIA, WITHOUT LONG-TERM CURRENT USE OF INSULIN (HCC): ICD-10-CM

## 2024-05-09 NOTE — TELEPHONE ENCOUNTER
Please review; protocol failed/ has no protocol      Adelaida Harden9 minutes ago (2:57 PM)     [id number] [place] [address] AT [address], [phone], [phone]      Patient stated he's has one day left of both scripts and scheduled on below - still needs a refill.  Patient advised he was scheduled for 5/7 but appt was moved and now he will be out of medication by the time he has his appointment.        Requested Prescriptions   Pending Prescriptions Disp Refills    dapagliflozin (FARXIGA) 10 MG Oral Tab 90 tablet 0     Sig: Take 1 tablet (10 mg total) by mouth daily.       Diabetes Medication Protocol Failed - 5/9/2024  2:57 PM        Failed - Last A1C < 7.5 and within past 6 months     Lab Results   Component Value Date    A1C 8.5 (H) 02/02/2024             Passed - In person appointment or virtual visit in the past 6 mos or appointment in next 3 mos     Recent Outpatient Visits              3 months ago Type 2 diabetes mellitus with diabetic nephropathy, without long-term current use of insulin ([facility])    [address] Amelia Guevara PA-C    Office Visit    8 months ago     [address] Renetta Barboza APRN    Virtual Phone E/M    9 months ago Type 2 diabetes mellitus with hyperglycemia, without long-term current use of insulin ([facility])    [address] Renetta Barboza APRN    Telemedicine    11 months ago     [facility] Rebecca Baptiste, PT    Office Visit    11 months ago     [facility] Rebecca Baptiste, PT    Office Visit          Future Appointments         Provider Department Appt Notes    In 2 weeks Amelia Guevara, PA-C [address] DM fup                    Passed - Microalbumin procedure in past 12 months or taking ACE/ARB        Passed - EGFRCR or  GFRNAA > 50     GFR Evaluation  EGFRCR: 68 , resulted on 2/2/2024          Passed - GFR in the past 12 months          glipiZIDE ER 5 MG Oral Tablet 24 Hr [Pharmacy Med Name: GLIPIZIDE ER 5MG TABLETS] 90 tablet 0     Sig: Take 1 tablet (5 mg total) by mouth daily.       Diabetes Medication Protocol Failed - 5/9/2024  2:57 PM        Failed - Last A1C < 7.5 and within past 6 months     Lab Results   Component Value Date    A1C 8.5 (H) 02/02/2024             Passed - In person appointment or virtual visit in the past 6 mos or appointment in next 3 mos     Recent Outpatient Visits              3 months ago Type 2 diabetes mellitus with diabetic nephropathy, without long-term current use of insulin ([facility])    [address] Amelia Guevara PA-C    Office Visit    8 months ago     [address] Renetta Barboza APRN    Virtual Phone E/M    9 months ago Type 2 diabetes mellitus with hyperglycemia, without long-term current use of insulin ([facility])    [address] Renetta Barboza APRN    Telemedicine    11 months ago     [facility] Rebecca Baptiste, PT    Office Visit    11 months ago     [facility] Rebecca Baptiste, PT    Office Visit          Future Appointments         Provider Department Appt Notes    In 2 weeks Amelia Guevara PA-C [address] DM fup                    Passed - Microalbumin procedure in past 12 months or taking ACE/ARB        Passed - EGFRCR or GFRNAA > 50     GFR Evaluation  EGFRCR: 68 , resulted on 2/2/2024          Passed - GFR in the past 12 months           Recent Outpatient Visits              3 months ago Type 2 diabetes mellitus with diabetic nephropathy, without long-term current use of insulin ([facility])    [address] Amelia Guevara,  MARIAJOSE    Office Visit    8 months ago     [facility], [address] [place] [address] Renetta Barboza APRN    Virtual Phone E/M    9 months ago Type 2 diabetes mellitus with hyperglycemia, without long-term current use of insulin (HCC)    [facility], [address] [place] [address] Renetta Barboza APRN    Telemedicine    11 months ago     [facility] Rebecca Baptiste, PT    Office Visit    11 months ago     [facility] Rebecca Baptiste, PT    Office Visit          Future Appointments         Provider Department Appt Notes    In 2 weeks Amelia Guevara, MARIAJOSE [facility], [address] [place] [address] ANNA jarrettp

## 2024-05-09 NOTE — TELEPHONE ENCOUNTER
[facility] #[id number] - [place], [place] - [facility] [address] AT [address], [phone], [phone]     Patient stated he's has one day left of both scripts and scheduled on below - still needs a refill.  Patient advised he was scheduled for 5/7 but appt was moved and now he will be out of medication by the time he has his appointment.    Future Appointments   Date Time Provider Department Center   5/23/2024  8:40 AM Amelia Guevara PA-C [facility]

## 2024-05-09 NOTE — TELEPHONE ENCOUNTER
Please review.  Protocol failed / Has no protocol.     Requested Prescriptions   Pending Prescriptions Disp Refills    FARXIGA 10 MG Oral Tab [Pharmacy Med Name: FARXIGA 10MG TABLETS] 90 tablet 0     Sig: TAKE 1 TABLET(10 MG) BY MOUTH DAILY       Diabetes Medication Protocol Failed - 5/8/2024 10:14 AM        Failed - Last A1C < 7.5 and within past 6 months     Lab Results   Component Value Date    A1C 8.5 (H) 02/02/2024             Passed - In person appointment or virtual visit in the past 6 mos or appointment in next 3 mos     Recent Outpatient Visits              3 months ago Type 2 diabetes mellitus with diabetic nephropathy, without long-term current use of insulin ([facility])    [address] Amelia Guevara PA-C    Office Visit    8 months ago     [address] Renetta Barboza APRN    Virtual Phone E/M    9 months ago Type 2 diabetes mellitus with hyperglycemia, without long-term current use of insulin ([facility])    [address] Renetta Barboza APRN    Telemedicine    11 months ago     [facility] Rebecca Baptiste, PT    Office Visit    11 months ago     [facility] Rebecca Baptiste, PT    Office Visit          Future Appointments         Provider Department Appt Notes    In 2 weeks Amelia Guevara PA-C [address] DM fup                    Passed - Microalbumin procedure in past 12 months or taking ACE/ARB        Passed - EGFRCR or GFRNAA > 50     GFR Evaluation  EGFRCR: 68 , resulted on 2/2/2024          Passed - GFR in the past 12 months          GLIPIZIDE ER 5 MG Oral Tablet 24 Hr [Pharmacy Med Name: GLIPIZIDE ER 5MG TABLETS] 90 tablet 0     Sig: TAKE 1 TABLET(5 MG) BY MOUTH DAILY       Diabetes Medication Protocol Failed - 5/8/2024 10:14 AM        Failed - Last A1C < 7.5 and within past 6 months      Lab Results   Component Value Date    A1C 8.5 (H) 02/02/2024             Passed - In person appointment or virtual visit in the past 6 mos or appointment in next 3 mos     Recent Outpatient Visits              3 months ago Type 2 diabetes mellitus with diabetic nephropathy, without long-term current use of insulin ([facility])    [address] Amelia Guevara PA-C    Office Visit    8 months ago     [address] Renetta Barboza, APRN    Virtual Phone E/M    9 months ago Type 2 diabetes mellitus with hyperglycemia, without long-term current use of insulin ([facility])    [address] Renetta Barboza, APRN    Telemedicine    11 months ago     [facility] Rebecca Baptiste, PT    Office Visit    11 months ago     [facility] Rebecca Baptiste, PT    Office Visit          Future Appointments         Provider Department Appt Notes    In 2 weeks Amelia Guevara PA-C [address]                    Passed - Microalbumin procedure in past 12 months or taking ACE/ARB        Passed - EGFRCR or GFRNAA > 50     GFR Evaluation  EGFRCR: 68 , resulted on 2/2/2024          Passed - GFR in the past 12 months           Future Appointments         Provider Department Appt Notes    In 2 weeks Amelia Guevara PA-C [address] DM fup          Recent Outpatient Visits              3 months ago Type 2 diabetes mellitus with diabetic nephropathy, without long-term current use of insulin ([facility])    [address] Amelia Guevara PA-C    Office Visit    8 months ago     [address] Renetta Barboza, APRN    Virtual Phone E/M    9 months ago Type 2 diabetes mellitus with hyperglycemia, without  long-term current use of insulin (HCC)    [facility], [address] [place], [place] Renetta Barboza, JAIME    Telemedicine    11 months ago     [facility] Rebecca Baptiste, PT    Office Visit    11 months ago     [facility] Rebecca Baptiste, PT    Office Visit

## 2024-05-10 DIAGNOSIS — E11.65 TYPE 2 DIABETES MELLITUS WITH HYPERGLYCEMIA, WITHOUT LONG-TERM CURRENT USE OF INSULIN (HCC): ICD-10-CM

## 2024-05-10 RX ORDER — DAPAGLIFLOZIN 10 MG/1
10 TABLET, FILM COATED ORAL DAILY
Qty: 30 TABLET | Refills: 0 | Status: SHIPPED | OUTPATIENT
Start: 2024-05-10

## 2024-05-10 RX ORDER — GLIPIZIDE 5 MG/1
5 TABLET, FILM COATED, EXTENDED RELEASE ORAL DAILY
Qty: 30 TABLET | Refills: 0 | Status: SHIPPED | OUTPATIENT
Start: 2024-05-10

## 2024-05-13 RX ORDER — GLIPIZIDE 5 MG/1
5 TABLET, FILM COATED, EXTENDED RELEASE ORAL DAILY
Qty: 90 TABLET | Refills: 0 | OUTPATIENT
Start: 2024-05-13

## 2024-05-21 ENCOUNTER — LAB ENCOUNTER (OUTPATIENT)
Dept: LAB | Age: 49
End: 2024-05-21
Attending: PHYSICIAN ASSISTANT

## 2024-05-21 LAB
ALBUMIN SERPL-MCNC: 4 G/DL (ref 3.4–5)
ALBUMIN/GLOB SERPL: 1 {RATIO} (ref 1–2)
ALP LIVER SERPL-CCNC: 96 U/L
ALT SERPL-CCNC: 21 U/L
ANION GAP SERPL CALC-SCNC: 4 MMOL/L (ref 0–18)
AST SERPL-CCNC: 16 U/L (ref 15–37)
BILIRUB SERPL-MCNC: 1.2 MG/DL (ref 0.1–2)
BUN BLD-MCNC: 18 MG/DL (ref 9–23)
CALCIUM BLD-MCNC: 9.2 MG/DL (ref 8.5–10.1)
CHLORIDE SERPL-SCNC: 111 MMOL/L (ref 98–112)
CHOLEST SERPL-MCNC: 124 MG/DL (ref ?–200)
CO2 SERPL-SCNC: 25 MMOL/L (ref 21–32)
CREAT BLD-MCNC: 1.11 MG/DL
EGFRCR SERPLBLD CKD-EPI 2021: 82 ML/MIN/1.73M2 (ref 60–?)
EST. AVERAGE GLUCOSE BLD GHB EST-MCNC: 143 MG/DL (ref 68–126)
FASTING PATIENT LIPID ANSWER: YES
FASTING STATUS PATIENT QL REPORTED: YES
GLOBULIN PLAS-MCNC: 4 G/DL (ref 2.8–4.4)
GLUCOSE BLD-MCNC: 81 MG/DL (ref 70–99)
HBA1C MFR BLD: 6.6 % (ref ?–5.7)
HDLC SERPL-MCNC: 39 MG/DL (ref 40–59)
LDLC SERPL CALC-MCNC: 70 MG/DL (ref ?–100)
NONHDLC SERPL-MCNC: 85 MG/DL (ref ?–130)
OSMOLALITY SERPL CALC.SUM OF ELEC: 291 MOSM/KG (ref 275–295)
POTASSIUM SERPL-SCNC: 4.7 MMOL/L (ref 3.5–5.1)
PROT SERPL-MCNC: 8 G/DL (ref 6.4–8.2)
SODIUM SERPL-SCNC: 140 MMOL/L (ref 136–145)
TRIGL SERPL-MCNC: 72 MG/DL (ref 30–149)
VLDLC SERPL CALC-MCNC: 11 MG/DL (ref 0–30)

## 2024-05-21 PROCEDURE — 80053 COMPREHEN METABOLIC PANEL: CPT | Performed by: PHYSICIAN ASSISTANT

## 2024-05-21 PROCEDURE — 36415 COLL VENOUS BLD VENIPUNCTURE: CPT | Performed by: PHYSICIAN ASSISTANT

## 2024-05-21 PROCEDURE — 80061 LIPID PANEL: CPT | Performed by: PHYSICIAN ASSISTANT

## 2024-05-21 PROCEDURE — 83036 HEMOGLOBIN GLYCOSYLATED A1C: CPT | Performed by: PHYSICIAN ASSISTANT

## 2024-05-23 ENCOUNTER — OFFICE VISIT (OUTPATIENT)
Dept: INTERNAL MEDICINE CLINIC | Facility: CLINIC | Age: 49
End: 2024-05-23

## 2024-05-23 VITALS
OXYGEN SATURATION: 98 % | DIASTOLIC BLOOD PRESSURE: 84 MMHG | TEMPERATURE: 98 F | BODY MASS INDEX: 37.25 KG/M2 | HEIGHT: 72 IN | SYSTOLIC BLOOD PRESSURE: 124 MMHG | WEIGHT: 275 LBS | HEART RATE: 62 BPM | RESPIRATION RATE: 18 BRPM

## 2024-05-23 DIAGNOSIS — I10 ESSENTIAL HYPERTENSION: ICD-10-CM

## 2024-05-23 DIAGNOSIS — E11.3513 TYPE 2 DIABETES MELLITUS WITH BOTH EYES AFFECTED BY PROLIFERATIVE RETINOPATHY AND MACULAR EDEMA, WITHOUT LONG-TERM CURRENT USE OF INSULIN (HCC): Primary | ICD-10-CM

## 2024-05-23 DIAGNOSIS — E78.5 HYPERLIPIDEMIA LDL GOAL <100: ICD-10-CM

## 2024-05-23 PROCEDURE — 99214 OFFICE O/P EST MOD 30 MIN: CPT | Performed by: PHYSICIAN ASSISTANT

## 2024-05-23 RX ORDER — LISINOPRIL 40 MG/1
40 TABLET ORAL DAILY
Qty: 90 TABLET | Refills: 3 | Status: SHIPPED | OUTPATIENT
Start: 2024-05-23

## 2024-05-23 RX ORDER — METOPROLOL SUCCINATE 100 MG/1
100 TABLET, EXTENDED RELEASE ORAL DAILY
Qty: 90 TABLET | Refills: 3 | Status: SHIPPED | OUTPATIENT
Start: 2024-05-23

## 2024-05-23 RX ORDER — HYDROCHLOROTHIAZIDE 12.5 MG/1
12.5 CAPSULE, GELATIN COATED ORAL DAILY
Qty: 90 CAPSULE | Refills: 3 | Status: SHIPPED | OUTPATIENT
Start: 2024-05-23

## 2024-05-23 RX ORDER — METOPROLOL TARTRATE 100 MG/1
100 TABLET ORAL DAILY
Qty: 90 TABLET | Refills: 1 | Status: CANCELLED | OUTPATIENT
Start: 2024-05-23

## 2024-05-23 RX ORDER — AMLODIPINE BESYLATE 5 MG/1
5 TABLET ORAL DAILY
Qty: 90 TABLET | Refills: 3 | Status: SHIPPED | OUTPATIENT
Start: 2024-05-23

## 2024-05-23 RX ORDER — LANCETS 33 GAUGE
1 EACH MISCELLANEOUS DAILY
Qty: 100 EACH | Refills: 3 | Status: SHIPPED | OUTPATIENT
Start: 2024-05-23

## 2024-05-23 NOTE — PROGRESS NOTES
Willie Ho III is a 49 year old male.   Chief Complaint   Patient presents with    Follow - Up     ES rm - 10 - f/u for DM     HPI:    Pt presents for DM f/u.   Since last visit he has started intermittent fasting for 16-18 hours per day. Weight is down from from 282 to 275 lbs.   Blood sugar has been better at home and fasting blood sugar is now . He feels more sensitive to increases in sugar and can tell if his sugar is >100. Denies symptoms of hypoglycemia.   A1c is down from 8.5 to 6.6.   Currently taking metformin 1000 mg bid, farxiga 10 mg daily, and glipizide ER 5 mg daily.   He has regular eye exams due to h/o retinopathy.        Allergies:  No Known Allergies   Current Meds:  Current Outpatient Medications   Medication Sig Dispense Refill    Lancets (ONETOUCH DELICA PLUS [id number]) Does not apply Misc 1 strip by In Vitro route daily. 100 each 3    metoprolol succinate  MG Oral Tablet 24 Hr Take 1 tablet (100 mg total) by mouth daily. 90 tablet 3    hydroCHLOROthiazide 12.5 MG Oral Cap Take 1 capsule (12.5 mg total) by mouth daily. 90 capsule 3    lisinopril 40 MG Oral Tab Take 1 tablet (40 mg total) by mouth daily. 90 tablet 3    amLODIPine 5 MG Oral Tab Take 1 tablet (5 mg total) by mouth daily. 90 tablet 3    dapagliflozin (FARXIGA) 10 MG Oral Tab Take 1 tablet (10 mg total) by mouth daily. 30 tablet 0    ATORVASTATIN 20 MG Oral Tab TAKE 1 TABLET(20 MG) BY MOUTH EVERY NIGHT 90 tablet 0    METOPROLOL TARTRATE 100 MG Oral Tab TAKE 1 TABLET BY MOUTH DAILY 90 tablet 1    cyclobenzaprine 10 MG Oral Tab Take 1 tablet (10 mg total) by mouth nightly.      diclofenac 75 MG Oral Tab EC Take 1 tablet (75 mg total) by mouth 2 (two) times daily.      metFORMIN HCl 1000 MG Oral Tab Take 1 tablet (1,000 mg total) by mouth 2 (two) times daily with meals. 180 tablet 1    Glucose Blood (ONETOUCH ULTRA) In Vitro Strip Check glucose  strip 1    OneTouch Delica Lancets 30G Does not apply Misc 2 Boxes  every 3 (three) months. Test blood sugar twice daily as directed 200 each 3    ONETOUCH ULTRA BLUE In Vitro Strip TEST DAILY AS DIRECTED. 100 strip 2        PMH:     Past Medical History:    Diabetes (HCC)       ROS:   GENERAL: Negative for fever, chills and fatigue. NAD.  HENT: Negative for congestion, sore throat, and ear pain.  RESPIRATORY: Negative for cough, chest tightness, shortness of breath and wheezing.    CV: Negative for chest pain, palpitations and leg swelling.   GI: Negative for nausea, vomiting, abdominal pain, diarrhea, and blood in stool.   : Negative for dysuria, hematuria and difficulty urinating.   MUSCULOSKELETAL: Negative for myalgias, back pain, joint swelling, arthralgias and gait problem.   NEURO: Negative for dizziness, syncope, weakness, numbness, tingling and headaches.   PSYCH: The patient is not nervous/anxious. No depression.      PHYSICAL EXAM:    /84   Pulse 62   Temp 97.7 °F (36.5 °C) (Temporal)   Resp 18   Ht 6' (1.829 m)   Wt 275 lb (124.7 kg)   SpO2 98%   BMI 37.30 kg/m²     GENERAL: NAD. A&Ox3  HEENT: Ear canals clear, TMs pearly gray bilaterally. Throat without erythema or exudates.  NECK: No LAD.   RESPIRATORY: CTAB, no R/R/W  CV: RRR, no murmurs.   ABD: Soft, non-tender, non-distended. +bowel sounds. No rebound tenderness.   NEURO: No focal deficits.   MUSCULOSKELETAL: Full ROM of all extremities. No swelling.   PSYCH: Appropriate mood and affect.      ASSESSMENT/ PLAN:   1. Essential hypertension  Controlled  CPM  - metoprolol succinate  MG Oral Tablet 24 Hr; Take 1 tablet (100 mg total) by mouth daily.  Dispense: 90 tablet; Refill: 3  - hydroCHLOROthiazide 12.5 MG Oral Cap; Take 1 capsule (12.5 mg total) by mouth daily.  Dispense: 90 capsule; Refill: 3  - lisinopril 40 MG Oral Tab; Take 1 tablet (40 mg total) by mouth daily.  Dispense: 90 tablet; Refill: 3    2. Type 2 diabetes mellitus with both eyes affected by proliferative retinopathy and macular  edema, without long-term current use of insulin (HCC)  Much better since he has started intermittent fasting  Stop glipizide   Continue metformin and farxiga   Recheck labs again in 3 months   - Comp Metabolic Panel (14) [E]; Future  - Hemoglobin A1C [E]; Future  - DIETITIAN EDUCATION INITIAL, DIET (INTERNAL)    3. Hyperlipidemia LDL goal <100  Improved with diet changes   Continue statin        Health Maintenance Due   Topic Date Due    Colorectal Cancer Screening  Never done    Annual Physical  Never done    COVID-19 Vaccine (2 - 2023-24 season) 09/01/2023    Diabetes Care Dilated Eye Exam  06/12/2024           Pt indicates understanding and agrees to the plan.     Return in about 3 months (around 8/23/2024) for diabetes follow up.    Amelia Guevara PA-C

## 2024-05-23 NOTE — PATIENT INSTRUCTIONS
Ophthalmology recommendations:  Dr Dey - [email or web address]  Dr Cannon - [email or web address]    Book recommendations:  The Obesity Code by Dr Irving Underwood   The Diabetes Code by Dr rIving Underwood  The Complete Guide to Fasting by Dr Irving Underwood

## 2024-06-08 DIAGNOSIS — E11.65 TYPE 2 DIABETES MELLITUS WITH HYPERGLYCEMIA, WITHOUT LONG-TERM CURRENT USE OF INSULIN (HCC): ICD-10-CM

## 2024-06-12 DIAGNOSIS — E11.21 TYPE 2 DIABETES MELLITUS WITH DIABETIC NEPHROPATHY, WITHOUT LONG-TERM CURRENT USE OF INSULIN (HCC): ICD-10-CM

## 2024-06-12 RX ORDER — GLIPIZIDE 5 MG/1
5 TABLET, FILM COATED, EXTENDED RELEASE ORAL DAILY
Qty: 30 TABLET | Refills: 0 | OUTPATIENT
Start: 2024-06-12

## 2024-06-12 RX ORDER — DAPAGLIFLOZIN 10 MG/1
10 TABLET, FILM COATED ORAL DAILY
Qty: 90 TABLET | Refills: 3 | Status: SHIPPED | OUTPATIENT
Start: 2024-06-12

## 2024-06-12 NOTE — TELEPHONE ENCOUNTER
Patient calling to check status on below medication.  Patient stated he's out of it and needs refill soon. High TE

## 2024-06-12 NOTE — TELEPHONE ENCOUNTER
Refill passed per [facility] protocols.    Requested Prescriptions   Pending Prescriptions Disp Refills    FARXIGA 10 MG Oral Tab [Pharmacy Med Name: FARXIGA 10MG TABLETS] 30 tablet 0     Sig: TAKE 1 TABLET(10 MG) BY MOUTH DAILY       Diabetes Medication Protocol Passed - 6/12/2024  1:36 PM        Passed - Last A1C < 7.5 and within past 6 months     Lab Results   Component Value Date    A1C 6.6 (H) 05/21/2024             Passed - In person appointment or virtual visit in the past 6 mos or appointment in next 3 mos     Recent Outpatient Visits              2 weeks ago Type 2 diabetes mellitus with both eyes affected by proliferative retinopathy and macular edema, without long-term current use of insulin ([facility])    [address] Amelia Guevara PA-C    Office Visit    4 months ago Type 2 diabetes mellitus with diabetic nephropathy, without long-term current use of insulin ([facility])    [address] Amelia Guevara PA-C    Office Visit    10 months ago     [address] Renetta Barboza APRN    Virtual Phone E/M    10 months ago Type 2 diabetes mellitus with hyperglycemia, without long-term current use of insulin ([facility])    [address] Renetta Barboza APRN    Telemedicine    1 year ago     [facility] Rebecca Baptiste, KATJA    Office Visit          Future Appointments         Provider Department Appt Notes    In 2 months Amelia Guevara PA-C [address] FUP-3 months                    Passed - Microalbumin procedure in past 12 months or taking ACE/ARB        Passed - EGFRCR or GFRNAA > 50     GFR Evaluation  EGFRCR: 82 , resulted on 5/21/2024          Passed - GFR in the past 12 months

## 2024-06-12 NOTE — TELEPHONE ENCOUNTER
Per last visit:    2. Type 2 diabetes mellitus with both eyes affected by proliferative retinopathy and macular edema, without long-term current use of insulin (HCC)  Much better since he has started intermittent fasting  Stop glipizide   Continue metformin and farxiga

## 2024-06-26 ENCOUNTER — TELEPHONE (OUTPATIENT)
Dept: INTERNAL MEDICINE CLINIC | Facility: CLINIC | Age: 49
End: 2024-06-26

## 2024-06-26 NOTE — TELEPHONE ENCOUNTER
Received PA request for dapagliflozin- brand name Farxiga covered. Will ask pharm to change. On hold with pharm.

## 2024-06-26 NOTE — TELEPHONE ENCOUNTER
On hold 10+ min. Called back and left vmail with instruction to change dapagliflozin to brand name Farxiga- which does not require a PA according to the paperwork via CoverMyMeds.

## 2024-07-28 NOTE — TELEPHONE ENCOUNTER
Patient disconnected phone call prior to completing triage. ECO RN called [facility]i Charles and daughter stated that she wasn't aware that her mother called doctor's office. Daughter stated that patient is currently with her sister and will follow up.    Pt cancelled appt with TB last week. Has not rescheduled. He has not seen TB since dx of DM II. As per TB's result note with labs, needs 30min OV soon. Please help pt reschedule.

## 2024-08-07 ENCOUNTER — PATIENT MESSAGE (OUTPATIENT)
Dept: INTERNAL MEDICINE CLINIC | Facility: CLINIC | Age: 49
End: 2024-08-07

## 2024-08-09 NOTE — TELEPHONE ENCOUNTER
From: Willie Ho III  To: Amelia Guevara  Sent: 8/7/2024 10:52 AM CDT  Subject: Testosterone Check    Amelia,    Is it possible to get my testosterone checked? Sometimes I feel like it might be low and according to “Dr. Castellano” it’s a possibility.     Thank you.    Willie

## 2024-08-12 ENCOUNTER — PATIENT MESSAGE (OUTPATIENT)
Dept: INTERNAL MEDICINE CLINIC | Facility: CLINIC | Age: 49
End: 2024-08-12

## 2024-08-12 RX ORDER — ATORVASTATIN CALCIUM 20 MG/1
20 TABLET, FILM COATED ORAL NIGHTLY
Qty: 90 TABLET | Refills: 3 | Status: SHIPPED | OUTPATIENT
Start: 2024-08-12

## 2024-08-12 NOTE — TELEPHONE ENCOUNTER
Refill passes per [facility] protocol.    Future Appointments   Date Time Provider Department Center   8/21/2024  8:00 AM Amelia Guevara PA-C [facility]     LAST OFFICE VISIT: 5/23/24    Requested Prescriptions   Pending Prescriptions Disp Refills    atorvastatin 20 MG Oral Tab 90 tablet 0     Sig: Take 1 tablet (20 mg total) by mouth nightly.       Cholesterol Medication Protocol Passed - 8/7/2024 10:46 AM        Passed - ALT < 80     Lab Results   Component Value Date    ALT 21 05/21/2024             Passed - ALT resulted within past year        Passed - Lipid panel within past 12 months     Lab Results   Component Value Date    CHOLEST 124 05/21/2024    TRIG 72 05/21/2024    HDL 39 (L) 05/21/2024    LDL 70 05/21/2024    VLDL 11 05/21/2024    TCHDLRATIO 5.42 (H) 07/03/2018    NONHDLC 85 05/21/2024             Passed - In person appointment or virtual visit in the past 12 mos or appointment in next 3 mos     Recent Outpatient Visits              2 months ago Type 2 diabetes mellitus with both eyes affected by proliferative retinopathy and macular edema, without long-term current use of insulin ([facility])    [facility], [address] [place] [address] Amelia Guevara PA-C    Office Visit    6 months ago Type 2 diabetes mellitus with diabetic nephropathy, without long-term current use of insulin ([facility])    [facility], [address] [place] [address] Amelia Guevara PA-C    Office Visit    12 months ago     [address] Renetta Barboza APRN    Virtual Phone E/M    1 year ago Type 2 diabetes mellitus with hyperglycemia, without long-term current use of insulin ([facility])    [facility], [address] [place] [address] Renetta Barboza APRN    Telemedicine    1 year ago     [facility] Rebecca Baptiste, PT    Office Visit          Future Appointments         Provider Department Appt Notes    In 1 week  Amelia Guevara PA-C [address]-3 months                         Future Appointments         Provider Department Appt Notes    In 1 week Amelia Guevara PA-C [address]-3 months          Recent Outpatient Visits              2 months ago Type 2 diabetes mellitus with both eyes affected by proliferative retinopathy and macular edema, without long-term current use of insulin ([facility])    [address] Amelia Guevara PA-C    Office Visit    6 months ago Type 2 diabetes mellitus with diabetic nephropathy, without long-term current use of insulin ([facility])    [address] Amelia Guevara PA-C    Office Visit    12 months ago     [address] Renetta Barboza APRN    Virtual Phone E/M    1 year ago Type 2 diabetes mellitus with hyperglycemia, without long-term current use of insulin ([facility])    [address] Renetta Barboza APRN    Telemedicine    1 year ago     [facility] Rebecca Baptiste, PT    Office Visit

## 2024-08-20 ENCOUNTER — LAB ENCOUNTER (OUTPATIENT)
Dept: LAB | Age: 49
End: 2024-08-20
Attending: PHYSICIAN ASSISTANT
Payer: MEDICAID

## 2024-08-20 DIAGNOSIS — E11.3513 TYPE 2 DIABETES MELLITUS WITH BOTH EYES AFFECTED BY PROLIFERATIVE RETINOPATHY AND MACULAR EDEMA, WITHOUT LONG-TERM CURRENT USE OF INSULIN (HCC): ICD-10-CM

## 2024-08-20 LAB
ALBUMIN SERPL-MCNC: 4.5 G/DL (ref 3.2–4.8)
ALBUMIN/GLOB SERPL: 1.7 {RATIO} (ref 1–2)
ALP LIVER SERPL-CCNC: 112 U/L
ALT SERPL-CCNC: 14 U/L
ANION GAP SERPL CALC-SCNC: 3 MMOL/L (ref 0–18)
AST SERPL-CCNC: 17 U/L (ref ?–34)
BILIRUB SERPL-MCNC: 0.8 MG/DL (ref 0.3–1.2)
BUN BLD-MCNC: 20 MG/DL (ref 9–23)
CALCIUM BLD-MCNC: 9.4 MG/DL (ref 8.7–10.4)
CHLORIDE SERPL-SCNC: 104 MMOL/L (ref 98–112)
CO2 SERPL-SCNC: 28 MMOL/L (ref 21–32)
CREAT BLD-MCNC: 1.28 MG/DL
EGFRCR SERPLBLD CKD-EPI 2021: 69 ML/MIN/1.73M2 (ref 60–?)
EST. AVERAGE GLUCOSE BLD GHB EST-MCNC: 157 MG/DL (ref 68–126)
FASTING STATUS PATIENT QL REPORTED: YES
GLOBULIN PLAS-MCNC: 2.7 G/DL (ref 2–3.5)
GLUCOSE BLD-MCNC: 93 MG/DL (ref 70–99)
HBA1C MFR BLD: 7.1 % (ref ?–5.7)
OSMOLALITY SERPL CALC.SUM OF ELEC: 282 MOSM/KG (ref 275–295)
POTASSIUM SERPL-SCNC: 4.6 MMOL/L (ref 3.5–5.1)
PROT SERPL-MCNC: 7.2 G/DL (ref 5.7–8.2)
SODIUM SERPL-SCNC: 135 MMOL/L (ref 136–145)

## 2024-08-20 PROCEDURE — 83036 HEMOGLOBIN GLYCOSYLATED A1C: CPT

## 2024-08-20 PROCEDURE — 80053 COMPREHEN METABOLIC PANEL: CPT

## 2024-08-20 PROCEDURE — 36415 COLL VENOUS BLD VENIPUNCTURE: CPT

## 2024-08-30 ENCOUNTER — TELEPHONE (OUTPATIENT)
Dept: INTERNAL MEDICINE CLINIC | Facility: CLINIC | Age: 49
End: 2024-08-30

## 2024-08-30 NOTE — TELEPHONE ENCOUNTER
Received supboena for records from [employer]-sent to legal department Amelia Pemberton    Copy in drawer up front

## 2024-08-30 NOTE — TELEPHONE ENCOUNTER
Received call from Carie from [employer]. He stated they are a 3rd party law firm and he is asking if we received the legal request via mail. Notified I do not see documentation we have received anything. I notified can send via fax. Provided our fax number. Notified we will need to review request/document. Stated he will follow up with a call on Tuesday to see if we have received.

## 2024-09-04 DIAGNOSIS — E11.21 TYPE 2 DIABETES MELLITUS WITH DIABETIC NEPHROPATHY, WITHOUT LONG-TERM CURRENT USE OF INSULIN (HCC): ICD-10-CM

## 2024-09-04 DIAGNOSIS — I10 ESSENTIAL HYPERTENSION: ICD-10-CM

## 2024-09-06 RX ORDER — LISINOPRIL 40 MG/1
40 TABLET ORAL DAILY
Qty: 90 TABLET | Refills: 3 | OUTPATIENT
Start: 2024-09-06

## 2024-09-06 RX ORDER — DAPAGLIFLOZIN 10 MG/1
10 TABLET, FILM COATED ORAL DAILY
Qty: 90 TABLET | Refills: 3 | OUTPATIENT
Start: 2024-09-06

## 2024-09-06 RX ORDER — AMLODIPINE BESYLATE 5 MG/1
5 TABLET ORAL DAILY
Qty: 90 TABLET | Refills: 3 | OUTPATIENT
Start: 2024-09-06

## 2024-09-06 RX ORDER — METOPROLOL SUCCINATE 100 MG/1
100 TABLET, EXTENDED RELEASE ORAL DAILY
Qty: 90 TABLET | Refills: 3 | OUTPATIENT
Start: 2024-09-06

## 2024-09-06 RX ORDER — HYDROCHLOROTHIAZIDE 12.5 MG/1
12.5 CAPSULE ORAL DAILY
Qty: 90 CAPSULE | Refills: 3 | OUTPATIENT
Start: 2024-09-06

## 2024-09-06 NOTE — TELEPHONE ENCOUNTER
Refill passed per [facility] protocol.  Requested Prescriptions   Pending Prescriptions Disp Refills    metFORMIN HCl 1000 MG Oral Tab 180 tablet 1     Sig: Take 1 tablet (1,000 mg total) by mouth 2 (two) times daily with meals.       Diabetes Medication Protocol Passed - 9/4/2024 11:59 AM        Passed - Last A1C < 7.5 and within past 6 months     Lab Results   Component Value Date    A1C 7.1 (H) 08/20/2024             Passed - In person appointment or virtual visit in the past 6 mos or appointment in next 3 mos     Recent Outpatient Visits              3 months ago Type 2 diabetes mellitus with both eyes affected by proliferative retinopathy and macular edema, without long-term current use of insulin ([facility])    [address] Amelia Guevara PA-C    Office Visit    7 months ago Type 2 diabetes mellitus with diabetic nephropathy, without long-term current use of insulin ([facility])    [address] Amelia Guevara PA-C    Office Visit    1 year ago     [address] Renetta Barboza APRN    Virtual Phone E/M    1 year ago Type 2 diabetes mellitus with hyperglycemia, without long-term current use of insulin ([facility])    [address] Renetta Barboza APRN    Telemedicine    1 year ago     [facility] Rebecca Baptiste, PT    Office Visit          Future Appointments         Provider Department Appt Notes    In 1 week Amelia Guevara PA-C [address] DM fu                    Passed - Microalbumin procedure in past 12 months or taking ACE/ARB        Passed - EGFRCR or GFRNAA > 50     GFR Evaluation  EGFRCR: 69 , resulted on 8/20/2024          Passed - GFR in the past 12 months          metoprolol succinate  MG Oral Tablet 24 Hr 90 tablet 3     Sig: Take 1 tablet (100 mg total)  by mouth daily.       Hypertension Medications Protocol Passed - 9/4/2024 11:59 AM        Passed - CMP or BMP in past 12 months        Passed - Last BP reading less than 140/90     BP Readings from Last 1 Encounters:   05/23/24 124/84               Passed - In person appointment or virtual visit in the past 12 mos or appointment in next 3 mos     Recent Outpatient Visits              3 months ago Type 2 diabetes mellitus with both eyes affected by proliferative retinopathy and macular edema, without long-term current use of insulin ([facility])    [address] Amelia Guevara PA-C    Office Visit    7 months ago Type 2 diabetes mellitus with diabetic nephropathy, without long-term current use of insulin ([facility])    [address] Amelia Guevara PA-C    Office Visit    1 year ago     [address] Renetta Barboza APRN    Virtual Phone E/M    1 year ago Type 2 diabetes mellitus with hyperglycemia, without long-term current use of insulin ([facility])    [address] Renetta Barboza APRN    Telemedicine    1 year ago     [facility] Therapy Rebecca Baptiste, PT    Office Visit          Future Appointments         Provider Department Appt Notes    In 1 week Amelia Guevara PA-C [address] fu                    Passed - EGFRCR or GFRNAA > 50     GFR Evaluation  EGFRCR: 69 , resulted on 8/20/2024            hydroCHLOROthiazide 12.5 MG Oral Cap 90 capsule 3     Sig: Take 1 capsule (12.5 mg total) by mouth daily.       Hypertension Medications Protocol Passed - 9/4/2024 11:59 AM        Passed - CMP or BMP in past 12 months        Passed - Last BP reading less than 140/90     BP Readings from Last 1 Encounters:   05/23/24 124/84               Passed - In person appointment or virtual visit in the  past 12 mos or appointment in next 3 mos     Recent Outpatient Visits              3 months ago Type 2 diabetes mellitus with both eyes affected by proliferative retinopathy and macular edema, without long-term current use of insulin ([facility])    [address] Amelia Guevara PA-C    Office Visit    7 months ago Type 2 diabetes mellitus with diabetic nephropathy, without long-term current use of insulin ([facility])    [address] Amelia Guevara PA-C    Office Visit    1 year ago     [address] Renetta Barboza APRN    Virtual Phone E/M    1 year ago Type 2 diabetes mellitus with hyperglycemia, without long-term current use of insulin ([facility])    [address] Renetta Barboza APRN    Telemedicine    1 year ago     [facility] Physical Therapy Rebecca Baptiste, PT    Office Visit          Future Appointments         Provider Department Appt Notes    In 1 week Amelia Guevara PA-C [address] DM fu                    Passed - EGFRCR or GFRNAA > 50     GFR Evaluation  EGFRCR: 69 , resulted on 8/20/2024            lisinopril 40 MG Oral Tab 90 tablet 3     Sig: Take 1 tablet (40 mg total) by mouth daily.       Hypertension Medications Protocol Passed - 9/4/2024 11:59 AM        Passed - CMP or BMP in past 12 months        Passed - Last BP reading less than 140/90     BP Readings from Last 1 Encounters:   05/23/24 124/84               Passed - In person appointment or virtual visit in the past 12 mos or appointment in next 3 mos     Recent Outpatient Visits              3 months ago Type 2 diabetes mellitus with both eyes affected by proliferative retinopathy and macular edema, without long-term current use of insulin ([facility])    [address] Amelia Guevara,  MARIAJOSE    Office Visit    7 months ago Type 2 diabetes mellitus with diabetic nephropathy, without long-term current use of insulin ([facility])    [address] Amelia Guevara PA-C    Office Visit    1 year ago     [address] Renetta Barboza APRN    Virtual Phone E/M    1 year ago Type 2 diabetes mellitus with hyperglycemia, without long-term current use of insulin ([facility])    [address] Renetta Barboza APRN    Telemedicine    1 year ago     [facility] Physical Therapy Rebecca Baptiste, KATJA    Office Visit          Future Appointments         Provider Department Appt Notes    In 1 week Amelia Guevara PA-C [address] fu                    Passed - EGFRCR or GFRNAA > 50     GFR Evaluation  EGFRCR: 69 , resulted on 8/20/2024            amLODIPine 5 MG Oral Tab 90 tablet 3     Sig: Take 1 tablet (5 mg total) by mouth daily.       Hypertension Medications Protocol Passed - 9/4/2024 11:59 AM        Passed - CMP or BMP in past 12 months        Passed - Last BP reading less than 140/90     BP Readings from Last 1 Encounters:   05/23/24 124/84               Passed - In person appointment or virtual visit in the past 12 mos or appointment in next 3 mos     Recent Outpatient Visits              3 months ago Type 2 diabetes mellitus with both eyes affected by proliferative retinopathy and macular edema, without long-term current use of insulin ([facility])    [address] Amelia Guevara PA-C    Office Visit    7 months ago Type 2 diabetes mellitus with diabetic nephropathy, without long-term current use of insulin ([facility])    [address] Amelia Guevara PA-C    Office Visit    1 year ago     [address] Jabari  JAIME Kennedy    Virtual Phone E/M    1 year ago Type 2 diabetes mellitus with hyperglycemia, without long-term current use of insulin ([facility])    [address] Renetta Barboza APRN    Telemedicine    1 year ago     [facility] Rebecca Baptiste, PT    Office Visit          Future Appointments         Provider Department Appt Notes    In 1 week Amelia Guevara PA-C [address] fu                    Passed - EGFRCR or GFRNAA > 50     GFR Evaluation  EGFRCR: 69 , resulted on 8/20/2024            dapagliflozin (FARXIGA) 10 MG Oral Tab 90 tablet 3     Sig: Take 1 tablet (10 mg total) by mouth daily.       Diabetes Medication Protocol Passed - 9/4/2024 11:59 AM        Passed - Last A1C < 7.5 and within past 6 months     Lab Results   Component Value Date    A1C 7.1 (H) 08/20/2024             Passed - In person appointment or virtual visit in the past 6 mos or appointment in next 3 mos     Recent Outpatient Visits              3 months ago Type 2 diabetes mellitus with both eyes affected by proliferative retinopathy and macular edema, without long-term current use of insulin ([facility])    [address] Amelia Guevara PA-C    Office Visit    7 months ago Type 2 diabetes mellitus with diabetic nephropathy, without long-term current use of insulin ([facility])    [address] Amelia Guevara PA-C    Office Visit    1 year ago     [address] Renetta Barboza APRN    Virtual Phone E/M    1 year ago Type 2 diabetes mellitus with hyperglycemia, without long-term current use of insulin ([facility])    [address] Renetta Barboza APRN    Telemedicine    1 year ago     [facility] Therapy Rebecca Baptiste, KATJA    Office Visit          Future  Appointments         Provider Department Appt Notes    In 1 week Amelia Guevara PA-C [address] DM fu                    Passed - Microalbumin procedure in past 12 months or taking ACE/ARB        Passed - EGFRCR or GFRNAA > 50     GFR Evaluation  EGFRCR: 69 , resulted on 8/20/2024          Passed - GFR in the past 12 months           Recent Outpatient Visits              3 months ago Type 2 diabetes mellitus with both eyes affected by proliferative retinopathy and macular edema, without long-term current use of insulin ([facility])    [address] Amelia Guevara PA-C    Office Visit    7 months ago Type 2 diabetes mellitus with diabetic nephropathy, without long-term current use of insulin ([facility])    [address] Amelia Guevara PA-C    Office Visit    1 year ago     [address] Renetta Barboza APRN    Virtual Phone E/M    1 year ago Type 2 diabetes mellitus with hyperglycemia, without long-term current use of insulin ([facility])    [address] Renetta Barboza APRN    Telemedicine    1 year ago     [facility] Physical Therapy Rebecca Baptiste, PT    Office Visit          Future Appointments         Provider Department Appt Notes    In 1 week Amelia Guevara PA-C [address]

## 2024-09-18 ENCOUNTER — OFFICE VISIT (OUTPATIENT)
Dept: INTERNAL MEDICINE CLINIC | Facility: CLINIC | Age: 49
End: 2024-09-18
Payer: MEDICAID

## 2024-09-18 ENCOUNTER — HOSPITAL ENCOUNTER (OUTPATIENT)
Dept: GENERAL RADIOLOGY | Age: 49
Discharge: HOME | End: 2024-09-18
Attending: PHYSICIAN ASSISTANT
Payer: MEDICAID

## 2024-09-18 ENCOUNTER — TELEPHONE (OUTPATIENT)
Dept: INTERNAL MEDICINE CLINIC | Facility: CLINIC | Age: 49
End: 2024-09-18

## 2024-09-18 VITALS
DIASTOLIC BLOOD PRESSURE: 74 MMHG | HEIGHT: 72 IN | RESPIRATION RATE: 18 BRPM | HEART RATE: 58 BPM | OXYGEN SATURATION: 99 % | WEIGHT: 258.19 LBS | TEMPERATURE: 97 F | BODY MASS INDEX: 34.97 KG/M2 | SYSTOLIC BLOOD PRESSURE: 128 MMHG

## 2024-09-18 DIAGNOSIS — N52.9 ERECTILE DYSFUNCTION, UNSPECIFIED ERECTILE DYSFUNCTION TYPE: ICD-10-CM

## 2024-09-18 DIAGNOSIS — J06.9 UPPER RESPIRATORY TRACT INFECTION, UNSPECIFIED TYPE: ICD-10-CM

## 2024-09-18 DIAGNOSIS — R68.82 LOW LIBIDO: ICD-10-CM

## 2024-09-18 DIAGNOSIS — E66.01 CLASS 2 SEVERE OBESITY DUE TO EXCESS CALORIES WITH SERIOUS COMORBIDITY AND BODY MASS INDEX (BMI) OF 35.0 TO 35.9 IN ADULT (HCC): ICD-10-CM

## 2024-09-18 DIAGNOSIS — I10 ESSENTIAL HYPERTENSION: Primary | ICD-10-CM

## 2024-09-18 DIAGNOSIS — E78.5 HYPERLIPIDEMIA LDL GOAL <100: ICD-10-CM

## 2024-09-18 DIAGNOSIS — E11.21 TYPE 2 DIABETES MELLITUS WITH DIABETIC NEPHROPATHY, WITHOUT LONG-TERM CURRENT USE OF INSULIN (HCC): ICD-10-CM

## 2024-09-18 DIAGNOSIS — E11.3513 TYPE 2 DIABETES MELLITUS WITH BOTH EYES AFFECTED BY PROLIFERATIVE RETINOPATHY AND MACULAR EDEMA, WITHOUT LONG-TERM CURRENT USE OF INSULIN (HCC): ICD-10-CM

## 2024-09-18 PROCEDURE — 71046 X-RAY EXAM CHEST 2 VIEWS: CPT | Performed by: PHYSICIAN ASSISTANT

## 2024-09-18 PROCEDURE — 99214 OFFICE O/P EST MOD 30 MIN: CPT | Performed by: PHYSICIAN ASSISTANT

## 2024-09-18 RX ORDER — ALBUTEROL SULFATE 90 UG/1
2 INHALANT RESPIRATORY (INHALATION) EVERY 4 HOURS PRN
Qty: 1 EACH | Refills: 0 | Status: SHIPPED | OUTPATIENT
Start: 2024-09-18

## 2024-09-18 RX ORDER — SEMAGLUTIDE 0.68 MG/ML
0.25 INJECTION, SOLUTION SUBCUTANEOUS WEEKLY
Qty: 3 ML | Refills: 0 | Status: SHIPPED | OUTPATIENT
Start: 2024-09-18

## 2024-09-18 NOTE — PROGRESS NOTES
Willie Ho III is a 49 year old male.   Chief Complaint   Patient presents with    Diabetes     EJ RM 8- Pt is here for a DM f/u     HPI:    Pt presents for f/u.     DM.   A1c has increased from 6.6 to 7.1. He admits to eating sweets in the evening more recently.   He is currently taking metformin 1000 mg bid and farxiga 10 mg daily. Tolerating meds well without side effects.     Weight.   He feels he has hit a plateau with his weight loss. Still doing intermittent fasting. He feels he could lose more weight if he was able to exercise more but limited with exercise due to chronic back pain.     Low sex drive and ED. Chronic issues and he was told this was due to his DM. However, now that DM is better controlled he wonders if there is another cause for his symptoms.     Cough and chest congestion x 2 weeks. Family members were diagnosed with pneumonia. He was seen at  and was given abx and claritin with some improvement in symptoms but now worse again. Denies fever.   Intermittent wheezing.   Non-smoker.   No asthma.      Allergies:  No Known Allergies   Current Meds:  Current Outpatient Medications   Medication Sig Dispense Refill    albuterol (PROAIR HFA) 108 (90 Base) MCG/ACT Inhalation Aero Soln Inhale 2 puffs into the lungs every 4 (four) hours as needed for Wheezing or Shortness of Breath. 1 each 0    semaglutide (OZEMPIC, 0.25 OR 0.5 MG/DOSE,) 2 MG/3ML Subcutaneous Solution Pen-injector Inject 0.25 mg into the skin once a week. 3 mL 0    metFORMIN HCl 1000 MG Oral Tab Take 1 tablet (1,000 mg total) by mouth 2 (two) times daily with meals. 180 tablet 3    atorvastatin 20 MG Oral Tab Take 1 tablet (20 mg total) by mouth nightly. 90 tablet 3    dapagliflozin (FARXIGA) 10 MG Oral Tab Take 1 tablet (10 mg total) by mouth daily. 90 tablet 3    Lancets (ONETOUCH DELICA PLUS [id number]) Does not apply Misc 1 strip by In Vitro route daily. 100 each 3    metoprolol succinate  MG Oral Tablet 24 Hr Take 1 tablet  (100 mg total) by mouth daily. 90 tablet 3    hydroCHLOROthiazide 12.5 MG Oral Cap Take 1 capsule (12.5 mg total) by mouth daily. 90 capsule 3    lisinopril 40 MG Oral Tab Take 1 tablet (40 mg total) by mouth daily. 90 tablet 3    amLODIPine 5 MG Oral Tab Take 1 tablet (5 mg total) by mouth daily. 90 tablet 3    METOPROLOL TARTRATE 100 MG Oral Tab TAKE 1 TABLET BY MOUTH DAILY 90 tablet 1    cyclobenzaprine 10 MG Oral Tab Take 1 tablet (10 mg total) by mouth nightly.      diclofenac 75 MG Oral Tab EC Take 1 tablet (75 mg total) by mouth 2 (two) times daily.      Glucose Blood (ONETOUCH ULTRA) In Vitro Strip Check glucose  strip 1    OneTouch Delica Lancets 30G Does not apply Misc 2 Boxes every 3 (three) months. Test blood sugar twice daily as directed 200 each 3    ONETOUCH ULTRA BLUE In Vitro Strip TEST DAILY AS DIRECTED. 100 strip 2        PMH:     Past Medical History:    Diabetes (HCC)       ROS:   Review of Systems   Constitutional:  Positive for fatigue. Negative for chills and fever.   HENT:  Positive for congestion and sinus pressure.    Respiratory:  Positive for cough and wheezing. Negative for shortness of breath.    Cardiovascular:  Negative for chest pain, palpitations and leg swelling.   Neurological:  Negative for dizziness, light-headedness and headaches.       PHYSICAL EXAM:    /74   Pulse 58   Temp 97.2 °F (36.2 °C) (Temporal)   Resp 18   Ht 6' (1.829 m)   Wt 258 lb 3.2 oz (117.1 kg)   SpO2 99%   BMI 35.02 kg/m²     GENERAL: NAD. A&Ox3  HEENT: Ear canals clear, TMs pearly gray bilaterally. Throat without erythema or exudates.  NECK: No LAD.   RESPIRATORY: CTAB, no R/R/W  CV: RRR, no murmurs.   ABD: Soft, non-tender, non-distended. +bowel sounds. No rebound tenderness.   NEURO: No focal deficits.   MUSCULOSKELETAL: Full ROM of all extremities. No swelling.   PSYCH: Appropriate mood and affect.      ASSESSMENT/ PLAN:   1. Essential hypertension  Controlled  CPM  - CT CALCIUM  SCORING; Future  - Comp Metabolic Panel (14) [E]; Future    2. Type 2 diabetes mellitus with diabetic nephropathy, without long-term current use of insulin ([facility])  A1c increased since last checked  Reviewed dietary changes  Continue metformin and farxiga   Add ozempic 0.25 mg weekly x 4 weeks, then increase to 0.5 mg weekly - no personal or family h/o pancreatitis or thyroid cancer, reviewed possible side effects and instructions for use   - CT CALCIUM SCORING; Future  - Comp Metabolic Panel (14) [E]; Future    3. Type 2 diabetes mellitus with both eyes affected by proliferative retinopathy and macular edema, without long-term current use of insulin ([facility])  Follows with ophtho regularly   - CT CALCIUM SCORING; Future    4. Hyperlipidemia LDL goal <100  On statin   Check cardiac calcium score   - CT CALCIUM SCORING; Future    5. Low libido  Check additional labs   - CT CALCIUM SCORING; Future    6. Erectile dysfunction, unspecified erectile dysfunction type  Check additional labs   Will also check cardiac calcium score   - CT CALCIUM SCORING; Future  - Testosterone, Total Free, Male [E]; Future  - TSH W Reflex To Free T4 [E]; Future    7. Upper respiratory tract infection, unspecified type  Check cxr today   Recommend albuterol prn   Mucinex DM prn   F/u if persists   - XR CHEST PA + LAT CHEST (CPT=71046); Future  - albuterol (PROAIR HFA) 108 (90 Base) MCG/ACT Inhalation Aero Soln; Inhale 2 puffs into the lungs every 4 (four) hours as needed for Wheezing or Shortness of Breath.  Dispense: 1 each; Refill: 0    8. Class 2 severe obesity due to excess calories with serious comorbidity and body mass index (BMI) of 35.0 to 35.9 in adult ([facility])  Add ozempic to further help with weight loss       Health Maintenance Due   Topic Date Due    Colorectal Cancer Screening  Never done    Annual Physical  Never done    COVID-19 Vaccine (2 - 2023-24 season) 09/01/2024           Pt indicates understanding and agrees to the plan.      Return in about 1 month (around 10/18/2024).    Amelia Guevara PA-C

## 2024-09-18 NOTE — TELEPHONE ENCOUNTER
Triage call transferred.   Spoke with pt f/u post OV today (9/18) and was prescribed:  semaglutide (OZEMPIC, 0.25 OR 0.5 MG/DOSE,) 2 MG/3ML Subcutaneous Solution Pen-injector 3 mL 0 9/18/2024 --   Sig:   Inject 0.25 mg into the skin once a week.       Informed will send message to our PA team. Pt should be notified once a determination has been received.

## 2024-09-20 NOTE — TELEPHONE ENCOUNTER
Denied prior auth.   Did call to clarify why its showed denied and approved on this same letter.  Per  [employer] rep it is reviewed by 2 depts and final outcome is denied.  See below and advise.

## 2024-09-20 NOTE — TELEPHONE ENCOUNTER
Denial states that he has only tried one med for DM. This is not correct. He currently taking metformin, farxiga, and glipizide and A1c is still above goal.

## 2024-09-20 NOTE — TELEPHONE ENCOUNTER
Denied    Note from payer: Details of this decision are provided on the physician outcome notice which has been faxed to the number on file.  Payer: [employer] [facility] Case ID: [id number]    [phone]  Electronic appeal: Not supported  View History

## 2024-09-23 NOTE — TELEPHONE ENCOUNTER
Will the patient be using the requested agent in combination with a DPP-4 containing agent (e.g. alogliptin, Januvia, Onglyza, saxagliptin, sitagliptin, Tradjenta, and Zituvio) for the requested indication?

## 2024-10-01 ENCOUNTER — TELEPHONE (OUTPATIENT)
Dept: INTERNAL MEDICINE CLINIC | Facility: CLINIC | Age: 49
End: 2024-10-01

## 2024-10-01 DIAGNOSIS — E11.21 TYPE 2 DIABETES MELLITUS WITH DIABETIC NEPHROPATHY, WITHOUT LONG-TERM CURRENT USE OF INSULIN (HCC): ICD-10-CM

## 2024-10-02 NOTE — TELEPHONE ENCOUNTER
Denied    Note from payer: Details of this decision are provided on the physician outcome notice which has been faxed to the number on file.  Payer: [employer] [facility] Case ID: [id number]    [phone]  Electronic appeal: Not supported  View History

## 2024-10-04 RX ORDER — ORAL SEMAGLUTIDE 3 MG/1
1 TABLET ORAL DAILY
Qty: 30 TABLET | Refills: 0 | Status: SHIPPED | OUTPATIENT
Start: 2024-10-04 | End: 2024-11-03

## 2024-10-04 RX ORDER — ORAL SEMAGLUTIDE 7 MG/1
7 TABLET ORAL DAILY
Qty: 30 TABLET | Refills: 1 | Status: CANCELLED | OUTPATIENT
Start: 2024-11-04 | End: 2024-12-04

## 2024-10-04 NOTE — TELEPHONE ENCOUNTER
Calling [employer] to discuss   Transferred to [facility] plan.  20+ minutes     Patient has medicaid plan. 30 day supply only, not 90    3mg is max per 180 days    Patient will need to move to 7mg after completing the initial 3mg    The rejection is because the prescriber sent 90 days. The 3mg is not covered for 90 days, only 30. Then he will need new dose of 7mg.    Please resend pended      Will contact pharmacy to have them re run for 30 days.

## 2024-10-04 NOTE — TELEPHONE ENCOUNTER
Spoke to patient, full name and date of birth verified.   Patient called while triage support was on the phone with [employer].   Informed patient that we are working on getting the [facility] approved.   Patient requests call when completed.

## 2024-12-09 ENCOUNTER — TELEPHONE (OUTPATIENT)
Dept: INTERNAL MEDICINE CLINIC | Facility: CLINIC | Age: 49
End: 2024-12-09

## 2024-12-09 DIAGNOSIS — E11.21 TYPE 2 DIABETES MELLITUS WITH DIABETIC NEPHROPATHY, WITHOUT LONG-TERM CURRENT USE OF INSULIN (HCC): Primary | ICD-10-CM

## 2024-12-09 NOTE — TELEPHONE ENCOUNTER
Please advise if ok to double book or add on to your schedule this week? Can we use SDA appointment time slot if that will work for patient?    Patient called in stating he is unable to get his refill of rybelsus, states that per the pharmacy his Insurance will not cover this medication.   States insurance will cover medication once every 2 days.    LOV 9/18/24    TE 10/1/24  0/4/24 12:39 PM  Note     30 days sent for 3 mg tabs. F/u in 1 month and will send 7 mg tabs at that time if doing well.      Giovanna Rolon CMA to Willie Ho III   DY      10/7/24  2:58 PM  Hi Willie,  The Rybelsus has been approved. You will take 3mg once a day for 30 days. Once completing the 3mg, if doing well, you will then move to 7mg once a day going forward. Please schedule to follow up in the office after 30 days. Please reach out for any questions.  Thank you,  Giovanna GORDON CMA    No follow up appointment made, offered appt for 12/12 at 11:40, patient states he can not make that appointment as he has another appointment at that time.

## 2024-12-10 RX ORDER — ORAL SEMAGLUTIDE 7 MG/1
1 TABLET ORAL DAILY
Qty: 90 TABLET | Refills: 1 | Status: SHIPPED | OUTPATIENT
Start: 2024-12-10

## 2024-12-10 NOTE — TELEPHONE ENCOUNTER
As long as he is tolerating med, then ok to increase to 7 mg dose and see me in 2-3 months.     No future appointments.

## 2024-12-11 NOTE — TELEPHONE ENCOUNTER
Called patient with provider recommendations. Advised pt to schedule follow up appt in 2-3 months. Patient verbalized understanding.

## 2025-03-12 ENCOUNTER — TELEPHONE (OUTPATIENT)
Dept: INTERNAL MEDICINE CLINIC | Facility: CLINIC | Age: 50
End: 2025-03-12

## 2025-03-12 NOTE — TELEPHONE ENCOUNTER
Received a diabetic eye exam from [facility], eye exam has been abstracted an placed in  CS bin for review.

## 2025-08-27 ENCOUNTER — NURSE TRIAGE (OUTPATIENT)
Dept: INTERNAL MEDICINE CLINIC | Facility: CLINIC | Age: 50
End: 2025-08-27

## 2025-08-27 LAB — AMB EXT COVID-19 RESULT: DETECTED

## 2025-08-28 DIAGNOSIS — I10 ESSENTIAL HYPERTENSION: ICD-10-CM

## 2025-08-28 DIAGNOSIS — E11.21 TYPE 2 DIABETES MELLITUS WITH DIABETIC NEPHROPATHY, WITHOUT LONG-TERM CURRENT USE OF INSULIN (HCC): ICD-10-CM

## 2025-08-30 RX ORDER — METOPROLOL SUCCINATE 100 MG/1
100 TABLET, EXTENDED RELEASE ORAL DAILY
Qty: 30 TABLET | Refills: 0 | Status: SHIPPED | OUTPATIENT
Start: 2025-08-30

## 2025-08-30 RX ORDER — HYDROCHLOROTHIAZIDE 12.5 MG/1
12.5 CAPSULE ORAL DAILY
Qty: 30 CAPSULE | Refills: 0 | Status: SHIPPED | OUTPATIENT
Start: 2025-08-30

## 2025-08-30 RX ORDER — ATORVASTATIN CALCIUM 20 MG/1
20 TABLET, FILM COATED ORAL NIGHTLY
Qty: 30 TABLET | Refills: 0 | Status: SHIPPED | OUTPATIENT
Start: 2025-08-30

## 2025-08-30 RX ORDER — AMLODIPINE BESYLATE 5 MG/1
5 TABLET ORAL DAILY
Qty: 30 TABLET | Refills: 0 | Status: SHIPPED | OUTPATIENT
Start: 2025-08-30

## 2025-08-30 RX ORDER — LISINOPRIL 40 MG/1
40 TABLET ORAL DAILY
Qty: 30 TABLET | Refills: 0 | Status: SHIPPED | OUTPATIENT
Start: 2025-08-30

## 2025-08-30 RX ORDER — ORAL SEMAGLUTIDE 7 MG/1
1 TABLET ORAL DAILY
Qty: 30 TABLET | Refills: 0 | Status: SHIPPED | OUTPATIENT
Start: 2025-08-30

## 2025-08-30 RX ORDER — DAPAGLIFLOZIN 10 MG/1
10 TABLET, FILM COATED ORAL DAILY
Qty: 30 TABLET | Refills: 0 | Status: SHIPPED | OUTPATIENT
Start: 2025-08-30

## (undated) DIAGNOSIS — Z13.29 SCREENING FOR THYROID DISORDER: Primary | ICD-10-CM

## (undated) DIAGNOSIS — I10 ESSENTIAL HYPERTENSION: ICD-10-CM

## (undated) DIAGNOSIS — I10 ESSENTIAL HYPERTENSION: Primary | ICD-10-CM

## (undated) DIAGNOSIS — E11.21 TYPE 2 DIABETES MELLITUS WITH DIABETIC NEPHROPATHY, UNSPECIFIED WHETHER LONG TERM INSULIN USE (HCC): Primary | ICD-10-CM

## (undated) DIAGNOSIS — H53.8 BLURRY VISION, LEFT EYE: ICD-10-CM

## (undated) DIAGNOSIS — E87.1 HYPONATREMIA: Primary | ICD-10-CM

## (undated) DIAGNOSIS — E11.21 TYPE 2 DIABETES MELLITUS WITH DIABETIC NEPHROPATHY, WITHOUT LONG-TERM CURRENT USE OF INSULIN (HCC): Primary | ICD-10-CM

## (undated) NOTE — LETTER
Patient Name: Carolyn Casas  YOB: 1975          MRN number:  [id number]  Date:  1/13/2020  Referring Physician:  Yohan Mariee         ELBOW AND HAND EVALUATION:    Referring Physician: Dr. Rosita Darling  Diagnosis: closed fx of L olecranon pr AROM: (* denotes performed with pain)  Elbow Wrist- WFL B   Flexion: R 135; L 90  Extension: R 0; L -30  Supination: R wnl, L 80  Pronation: R wnl, L 80        Strength/MMT:   Elbow Wrist   Flexion: R 5/5; L 4/5  Extension: R 5/5; L NT  Supination: R 5/5; for this course of care. Thank you for your referral. Please co-sign or sign and return this letter via fax as soon as possible to [phone].  If you have any questions, please contact me at Dept: [phone]    Sincerely,  Electronically signed by carlton

## (undated) NOTE — LETTER
04/17/20        [address]  [address] [address]      Dear Jari Cooks,    [facility] records indicate that you have outstanding lab work and or testing that was ordered for you and has not yet been completed:  Please be aware your labs/candy

## (undated) NOTE — LETTER
9/20/2023    [address]  [address] [address]    Dear Karo Wilkins,    The providers at [address] [place] [address],[address] are concerned about your health. You have not been seen at this office since 1/17/23. At that time, 3 mo annual physical and follow up on your diabetes and blood pressure was recommended. Our office attempted to contact you by phone on 7/17/23, 7/20/23, 7/26/23, 8/15/23, 8/18/23, 8/23/23, 8/24/23, 9/6/23, NAVITIME JAPANhart on 2/17/23, 7/20/23, 8/11/23, 8/15/23, 9/6/23, 9/12/23, Mail on 7/26/23, 8/24/23. If you are interested in retaining your active patient status at [address] [place] [address],[facility] [address], please review the following expectations:  Schedule and attend an appointment with Dr. Sagar Ulrich or partner by 10/20/23   Continue to attend appointments as recommended       It is the policy of the [address] [place] [address],[address] to terminate a physician-patient relationship for non-compliance. Please be advised that should this continue, you will be asked to see another medical group for your care . [facility] will contact [facility] and request your transfer to another medical group. [facility] will assist you in transitioning by providing up to 30 days of urgent care and transfer of your medical records to a new provider with your permission. To schedule an appointment, please use [employer] or call us at [phone]. Thank you for your understanding of this serious matter.      Sincerely,  [facility]

## (undated) NOTE — LETTER
04/09/18        [address]      Dear Saman Whyte records indicate that you have outstanding lab work and or testing that was ordered for you and has not yet been completed:          CBC W Differential W

## (undated) NOTE — LETTER
10/24/19        [address]  [address] [address]      Dear Michelle [facility],    [facility] records indicate that you have outstanding lab work and or testing that was ordered for you and has not yet been completed:  Orders Placed This Encounte

## (undated) NOTE — LETTER
04/09/18        [address]      Dear Jai Bellamy records indicate that you have outstanding lab work and or testing that was ordered for you and has not yet been completed:          CBC W Differential W

## (undated) NOTE — LETTER
12/27/23    At [facility], patient care is our priority.  To best serve our patients, our mission is to be a partner in your healthcare.  Just as you have expectations of your providers and their office staff, they have expectations of you, their patient.  We expect that you will always communicate honestly and openly.  Once a plan of care is developed, we expect that you will follow the reasonable recommendations of your provider.  We will do our best to work with you to overcome any barriers that  your way from meeting your provider's expectations.    Your last appointment with us was on 7/19/23  . At that time we asked that you follow up with our office in 4 weeks on 8/16/23 . You have yet to do so. We have made several attempts to contact you but we have not had success.     If you have established care with another diabetes care provider,  please contact our office so we may update your records.     Please accept this letter as a warning that if we do not hear from you within two weeks from the date on this letter that your diabetes care will be transferred back to your primary care office. After that time, your diabetes medication refills will need to be obtained from their office.     Sincerely,        JAIME Mendoza

## (undated) NOTE — LETTER
01/04/24    Aniceto Tapia,  At [facility], patient care is our priority.  To best serve our patients, our mission is to be a partner in your healthcare.  Just as you have expectations of your providers and their office staff, they have expectations of you, their patient.  We expect that you will always communicate honestly and openly.  Once a plan of care is developed, we expect that you will follow the reasonable recommendations of your provider.  We will do our best to work with you to overcome any barriers that  your way from meeting your provider's expectations.    Your last appointment with us was on 7/19/23  . At that time we asked that you follow up with our office on  8/16/23 . You have yet to do so. We have made several attempts to contact you but we have not had success. You were a no-show to your last 2 appointments with us in both August and January of 2024. We sent you a previous warning letter as well regarding this issue.      Please accept this letter as a dismissal. Your diabetes care will be transferred back to your primary care office or you will need to establish care with an Endocrinologist. After that time, your diabetes medication refills will need to be obtained from their office.     Sincerely,

## (undated) NOTE — LETTER
Patient Name: Atul Jj  YOB: 1975          MRN :  [id number]  Date:  1/15/2020  Referring Physician:  David Ruiz    Progress Summary  Pt has attended 2,  visits in Physical Therapy.      Subjective\": Yannick Owens against something at work